# Patient Record
Sex: MALE | Race: OTHER | HISPANIC OR LATINO
[De-identification: names, ages, dates, MRNs, and addresses within clinical notes are randomized per-mention and may not be internally consistent; named-entity substitution may affect disease eponyms.]

---

## 2018-12-04 ENCOUNTER — APPOINTMENT (OUTPATIENT)
Dept: NEUROLOGY | Facility: CLINIC | Age: 58
End: 2018-12-04
Payer: COMMERCIAL

## 2018-12-04 VITALS
DIASTOLIC BLOOD PRESSURE: 81 MMHG | HEART RATE: 87 BPM | WEIGHT: 130 LBS | SYSTOLIC BLOOD PRESSURE: 146 MMHG | HEIGHT: 62 IN | BODY MASS INDEX: 23.92 KG/M2

## 2018-12-04 DIAGNOSIS — Z87.19 PERSONAL HISTORY OF OTHER DISEASES OF THE DIGESTIVE SYSTEM: ICD-10-CM

## 2018-12-04 DIAGNOSIS — Z86.39 PERSONAL HISTORY OF OTHER ENDOCRINE, NUTRITIONAL AND METABOLIC DISEASE: ICD-10-CM

## 2018-12-04 DIAGNOSIS — Z87.442 PERSONAL HISTORY OF URINARY CALCULI: ICD-10-CM

## 2018-12-04 DIAGNOSIS — Z87.39 PERSONAL HISTORY OF OTHER DISEASES OF THE MUSCULOSKELETAL SYSTEM AND CONNECTIVE TISSUE: ICD-10-CM

## 2018-12-04 PROCEDURE — 99205 OFFICE O/P NEW HI 60 MIN: CPT

## 2018-12-05 ENCOUNTER — OTHER (OUTPATIENT)
Age: 58
End: 2018-12-05

## 2018-12-15 ENCOUNTER — OUTPATIENT (OUTPATIENT)
Dept: OUTPATIENT SERVICES | Facility: HOSPITAL | Age: 58
LOS: 1 days | End: 2018-12-15

## 2018-12-15 DIAGNOSIS — G95.9 DISEASE OF SPINAL CORD, UNSPECIFIED: ICD-10-CM

## 2018-12-23 ENCOUNTER — FORM ENCOUNTER (OUTPATIENT)
Age: 58
End: 2018-12-23

## 2018-12-24 ENCOUNTER — APPOINTMENT (OUTPATIENT)
Dept: MRI IMAGING | Facility: CLINIC | Age: 58
End: 2018-12-24
Payer: COMMERCIAL

## 2018-12-24 ENCOUNTER — OUTPATIENT (OUTPATIENT)
Dept: OUTPATIENT SERVICES | Facility: HOSPITAL | Age: 58
LOS: 1 days | End: 2018-12-24
Payer: COMMERCIAL

## 2018-12-24 DIAGNOSIS — Z00.8 ENCOUNTER FOR OTHER GENERAL EXAMINATION: ICD-10-CM

## 2018-12-24 PROCEDURE — 70551 MRI BRAIN STEM W/O DYE: CPT

## 2018-12-24 PROCEDURE — 72141 MRI NECK SPINE W/O DYE: CPT

## 2018-12-24 PROCEDURE — 70551 MRI BRAIN STEM W/O DYE: CPT | Mod: 26

## 2018-12-24 PROCEDURE — 72141 MRI NECK SPINE W/O DYE: CPT | Mod: 26

## 2019-01-08 ENCOUNTER — APPOINTMENT (OUTPATIENT)
Dept: SURGICAL ONCOLOGY | Facility: CLINIC | Age: 59
End: 2019-01-08
Payer: COMMERCIAL

## 2019-01-08 VITALS
WEIGHT: 138 LBS | HEIGHT: 62 IN | SYSTOLIC BLOOD PRESSURE: 126 MMHG | RESPIRATION RATE: 15 BRPM | HEART RATE: 93 BPM | BODY MASS INDEX: 25.4 KG/M2 | DIASTOLIC BLOOD PRESSURE: 78 MMHG | OXYGEN SATURATION: 97 %

## 2019-01-08 PROCEDURE — 99244 OFF/OP CNSLTJ NEW/EST MOD 40: CPT

## 2019-01-08 NOTE — PHYSICAL EXAM
[Normal] : supple, no neck mass and thyroid not enlarged [Normal] : oriented to person, place and time, with appropriate affect

## 2019-01-16 NOTE — REVIEW OF SYSTEMS
[Patient Intake Form Reviewed] : Patient intake form was reviewed [Negative] : Heme/Lymph [FreeTextEntry8] : abdominal pain, dark stools, constipation

## 2019-01-16 NOTE — ASSESSMENT
[FreeTextEntry1] : 58 year old male presents for an initial consultation for newly diagnosed gastric cancer, referred by Dr. Nguyen. \par The patient was found to have iron deficiency anemia and was experiencing right abdominal pain and was referred for an endoscopy on 12/21/18. On endoscopy he was found to have a large antral mass, about 4 cm, with central umbilication, found in the antrum, next to the incisura.  Pathology of the stomach antrum mass showed invasive moderately differentiated adenocarcinoma, negative for H. Pylori.  Subsequently, he underwent a CT of the abdomen and pelvis on 12/28/18 which showed a mass in the gastric body near the antrum measuring 3.8 x 1.47 cm in size, mildly enlarged gastrohepatic ligament lymph nodes, enlarged spleen measuring 15.2 cm, numerous bilateral nonobstructing renal stones, bilateral renal stones, cholelithiasis. \par \par \par PLAN:\par 1) EUS for full staging\par 2) Referral to medical oncology for likely perioperative chemotherapy\par 3) RTO in 2-3 weeks

## 2019-01-16 NOTE — HISTORY OF PRESENT ILLNESS
[de-identified] : 58 year old male presents for an initial consultation for newly diagnosed gastric cancer, referred by Dr. Nguyen. \par \par The patient was found to have iron deficiency anemia and was experiencing right abdominal pain and was referred for an endoscopy on 12/21/18. On endoscopy he was found to have a large antral mass, about 4 cm, with central umbilication, found in the antrum, next to the incisura.  Pathology of the stomach antrum mass showed invasive moderately differentiated adenocarcinoma, negative for H. Pylori. \par \par Subsequently, he underwent a CT of the abdomen and pelvis on 12/28/18 which showed a mass in the gastric body near the antrum measuring 3.8 x 1.47 cm in size, mildly enlarged gastrohepatic ligament lymph nodes, enlarged spleen measuring 15.2 cm, numerous bilateral nonobstructing renal stones, bilateral renal stones, cholelithiasis. \par \par PMH notable for kidney stones s/p kidney stone extraction, right inguinal hernia repair, arthritis. Never a smoker, denies alcohol use.  Denies family history of malignancies. \par \par Today he is with c/o occasional right upper quadrant abdominal pain which is worse after eating x 1 week.  He denies nausea, vomiting or bowel habit changes. He states he was started on iron supplements and since then his stools have been very dark and he has been constipated. Denies BRBPR, passing flatus.

## 2019-01-16 NOTE — CONSULT LETTER
[Dear  ___] : Dear  [unfilled], [Consult Letter:] : I had the pleasure of evaluating your patient, [unfilled]. [Please see my note below.] : Please see my note below. [Consult Closing:] : Thank you very much for allowing me to participate in the care of this patient.  If you have any questions, please do not hesitate to contact me. [Sincerely,] : Sincerely, [DrArleen  ___] : Dr. GAMEZ [FreeTextEntry3] : Trey Peter MD, MPH, FACS\par Surgical Oncology\par Assistant Professor of Surgery\par [facility] [employer] at [facility]

## 2019-01-16 NOTE — REASON FOR VISIT
[Initial Consultation] : an initial consultation for [Gastric Cancer] : gastric cancer [[employer] ] : provided by [employer]   [FreeTextEntry1] : [id number] [FreeTextEntry2] : Fausto

## 2019-01-18 ENCOUNTER — OTHER (OUTPATIENT)
Age: 59
End: 2019-01-18

## 2019-01-29 ENCOUNTER — OUTPATIENT (OUTPATIENT)
Dept: OUTPATIENT SERVICES | Facility: HOSPITAL | Age: 59
LOS: 1 days | End: 2019-01-29
Payer: COMMERCIAL

## 2019-01-29 ENCOUNTER — APPOINTMENT (OUTPATIENT)
Dept: GASTROENTEROLOGY | Facility: HOSPITAL | Age: 59
End: 2019-01-29

## 2019-01-29 DIAGNOSIS — C16.9 MALIGNANT NEOPLASM OF STOMACH, UNSPECIFIED: ICD-10-CM

## 2019-01-29 PROCEDURE — 43259 EGD US EXAM DUODENUM/JEJUNUM: CPT | Mod: GC

## 2019-01-29 PROCEDURE — 43237 ENDOSCOPIC US EXAM ESOPH: CPT | Mod: 58

## 2019-02-05 ENCOUNTER — APPOINTMENT (OUTPATIENT)
Dept: SURGICAL ONCOLOGY | Facility: CLINIC | Age: 59
End: 2019-02-05
Payer: COMMERCIAL

## 2019-02-05 VITALS
HEIGHT: 62 IN | RESPIRATION RATE: 15 BRPM | BODY MASS INDEX: 25.4 KG/M2 | HEART RATE: 95 BPM | SYSTOLIC BLOOD PRESSURE: 109 MMHG | WEIGHT: 138 LBS | DIASTOLIC BLOOD PRESSURE: 69 MMHG

## 2019-02-05 PROCEDURE — 99214 OFFICE O/P EST MOD 30 MIN: CPT

## 2019-02-05 NOTE — ASSESSMENT
[FreeTextEntry1] : 58 year old male presents for a follow up visit. Initially consulted 1/8/19 for newly diagnosed gastric cancer, referred by Dr. Nguyen.  The patient was found to have iron deficiency anemia and was experiencing right abdominal pain and was referred for an endoscopy on 12/21/18. On endoscopy he was found to have a large antral mass, about 4 cm, with central umbilication, found in the antrum, next to the incisura.  Pathology of the stomach antrum mass showed invasive moderately differentiated adenocarcinoma, negative for H. Pylori.  Subsequently, he underwent a CT of the abdomen and pelvis on 12/28/18 which showed a mass in the gastric body near the antrum measuring 3.8 x 1.47 cm in size, mildly enlarged gastrohepatic ligament lymph nodes, enlarged spleen measuring 15.2 cm, numerous bilateral nonobstructing renal stones, bilateral renal stones, cholelithiasis. \par \par S/p EUS with Dr. Macdonald - Found to have a 36 mm x 26 mm hypoechoic heterogenous lesion in the gastric antrum extending into the serosa. A 1 cm lymph node was seen along the gastrohepatic ligament. This was staged T3N1 by EUS Criteria. \par \par He is scheduled for a Mediport placement tomorrow. Will begin chemotherapy with Dr. Barrera. \par \par \par PLAN:\par 1) Chemotherapy as per Dr. Barrera\par 2) RTO in 3 months after preoperative chemotherapy

## 2019-02-05 NOTE — HISTORY OF PRESENT ILLNESS
[de-identified] : 58 year old male presents for a follow up visit.\par Initially consulted 1/8/19 for newly diagnosed gastric cancer, referred by Dr. Nguyen. \par \par The patient was found to have iron deficiency anemia and was experiencing right abdominal pain and was referred for an endoscopy on 12/21/18. On endoscopy he was found to have a large antral mass, about 4 cm, with central umbilication, found in the antrum, next to the incisura.  Pathology of the stomach antrum mass showed invasive moderately differentiated adenocarcinoma, negative for H. Pylori. \par \par Subsequently, he underwent a CT of the abdomen and pelvis on 12/28/18 which showed a mass in the gastric body near the antrum measuring 3.8 x 1.47 cm in size, mildly enlarged gastrohepatic ligament lymph nodes, enlarged spleen measuring 15.2 cm, numerous bilateral nonobstructing renal stones, bilateral renal stones, cholelithiasis. \par \par PMH notable for kidney stones s/p kidney stone extraction, right inguinal hernia repair, arthritis. Never a smoker, denies alcohol use.  Denies family history of malignancies. \par \par Today he is with c/o occasional right upper quadrant abdominal pain which is worse after eating x 1 week.  He denies nausea, vomiting or bowel habit changes. He states he was started on iron supplements and since then his stools have been very dark and he has been constipated. Denies BRBPR, passing flatus. \par \par INTERVAL HISTORY:\par 1/29/19 -S/p EUS with Dr. Macdonald - Found to have a 36 mm x 26 mm hypoechoic heterogenous lesion in the gastric antrum extending into the serosa. A 1 cm lymph node was seen along the gastrohepatic ligament. This was staged T3N1 by EUS Criteria. \par \par 2/5/19 - The patient is scheduled for a Mediport placement tomorrow. He will begin chemotherapy 5 days after Mediport placement under the care of Dr. Barrera.  Today he is with c/o mild, occasional mid abdominal discomfort. No other changes.

## 2019-02-05 NOTE — REASON FOR VISIT
[Follow-Up Visit] : a follow-up visit for [Gastric Cancer] : gastric cancer [[employer] ] : provided by [employer]   [FreeTextEntry1] : [id number] [FreeTextEntry2] : Henrry

## 2019-02-05 NOTE — CONSULT LETTER
[Dear  ___] : Dear  [unfilled], [Consult Letter:] : I had the pleasure of evaluating your patient, [unfilled]. [Please see my note below.] : Please see my note below. [Consult Closing:] : Thank you very much for allowing me to participate in the care of this patient.  If you have any questions, please do not hesitate to contact me. [Sincerely,] : Sincerely, [DrArleen  ___] : Dr. GAMEZ [FreeTextEntry3] : Trey Peter MD, MPH, FACS\par Surgical Oncology\par Assistant Professor of Surgery\par [facility] [employer] at [facility]

## 2019-02-06 ENCOUNTER — OUTPATIENT (OUTPATIENT)
Dept: OUTPATIENT SERVICES | Facility: HOSPITAL | Age: 59
LOS: 1 days | End: 2019-02-06
Payer: COMMERCIAL

## 2019-02-06 DIAGNOSIS — C16.0 MALIGNANT NEOPLASM OF CARDIA: ICD-10-CM

## 2019-02-06 LAB
ANION GAP SERPL CALC-SCNC: 11 MMOL/L — SIGNIFICANT CHANGE UP (ref 5–17)
BUN SERPL-MCNC: 12 MG/DL — SIGNIFICANT CHANGE UP (ref 7–23)
CALCIUM SERPL-MCNC: 9.2 MG/DL — SIGNIFICANT CHANGE UP (ref 8.4–10.5)
CHLORIDE SERPL-SCNC: 105 MMOL/L — SIGNIFICANT CHANGE UP (ref 96–108)
CO2 SERPL-SCNC: 23 MMOL/L — SIGNIFICANT CHANGE UP (ref 22–31)
CREAT SERPL-MCNC: 0.88 MG/DL — SIGNIFICANT CHANGE UP (ref 0.5–1.3)
GLUCOSE SERPL-MCNC: 129 MG/DL — HIGH (ref 70–99)
INR BLD: 1.21 RATIO — HIGH (ref 0.88–1.16)
POTASSIUM SERPL-MCNC: 4.1 MMOL/L — SIGNIFICANT CHANGE UP (ref 3.5–5.3)
POTASSIUM SERPL-SCNC: 4.1 MMOL/L — SIGNIFICANT CHANGE UP (ref 3.5–5.3)
PROTHROM AB SERPL-ACNC: 13.9 SEC — HIGH (ref 10–12.9)
SODIUM SERPL-SCNC: 139 MMOL/L — SIGNIFICANT CHANGE UP (ref 135–145)

## 2019-02-06 PROCEDURE — 36561 INSERT TUNNELED CV CATH: CPT

## 2019-02-06 PROCEDURE — C1894: CPT

## 2019-02-06 PROCEDURE — 77001 FLUOROGUIDE FOR VEIN DEVICE: CPT | Mod: 26

## 2019-02-06 PROCEDURE — 76937 US GUIDE VASCULAR ACCESS: CPT | Mod: 26

## 2019-02-06 PROCEDURE — 80048 BASIC METABOLIC PNL TOTAL CA: CPT

## 2019-02-06 PROCEDURE — 85610 PROTHROMBIN TIME: CPT

## 2019-02-06 PROCEDURE — C1769: CPT

## 2019-02-06 PROCEDURE — 77001 FLUOROGUIDE FOR VEIN DEVICE: CPT

## 2019-02-06 PROCEDURE — 76937 US GUIDE VASCULAR ACCESS: CPT

## 2019-02-06 PROCEDURE — C1788: CPT

## 2019-02-07 ENCOUNTER — APPOINTMENT (OUTPATIENT)
Dept: NEUROLOGY | Facility: CLINIC | Age: 59
End: 2019-02-07
Payer: COMMERCIAL

## 2019-02-07 DIAGNOSIS — G95.9 DISEASE OF SPINAL CORD, UNSPECIFIED: ICD-10-CM

## 2019-02-07 PROCEDURE — 99214 OFFICE O/P EST MOD 30 MIN: CPT | Mod: 25

## 2019-02-07 PROCEDURE — 95885 MUSC TST DONE W/NERV TST LIM: CPT

## 2019-02-07 PROCEDURE — 95909 NRV CNDJ TST 5-6 STUDIES: CPT

## 2019-02-07 NOTE — HISTORY OF PRESENT ILLNESS
[FreeTextEntry1] : Patient notes no change in weakness\par \par MRI C spine and brain not revealing, labs not done

## 2019-02-07 NOTE — ASSESSMENT
[FreeTextEntry1] : EMG/NCV today is consistent with early signs of MND.  \par \par Will check CPK, vit B12 and MMA, f/u 4 months, if exam progresses, will start riluzole at that time.  He does not currently fulfil criteria for definite ALS, but he does for probable

## 2019-02-07 NOTE — PHYSICAL EXAM
[Person] : oriented to person [Place] : oriented to place [Time] : oriented to time [Short Term Intact] : short term memory intact [Remote Intact] : remote memory intact [Registration Intact] : recent registration memory intact [Concentration Intact] : normal concentrating ability [Visual Intact] : visual attention was ~T not ~L decreased [Naming Objects] : no difficulty naming common objects [Repeating Phrases] : no difficulty repeating a phrase [Writing A Sentence] : no difficulty writing a sentence [Fluency] : fluency intact [Comprehension] : comprehension intact [Reading] : reading intact [Past History] : adequate knowledge of personal past history [Cranial Nerves Optic (II)] : visual acuity intact bilaterally,  visual fields full to confrontation, pupils equal round and reactive to light [Cranial Nerves Oculomotor (III)] : extraocular motion intact [Cranial Nerves Trigeminal (V)] : facial sensation intact symmetrically [Cranial Nerves Facial (VII)] : face symmetrical [Cranial Nerves Vestibulocochlear (VIII)] : hearing was intact bilaterally [Cranial Nerves Glossopharyngeal (IX)] : tongue and palate midline [Cranial Nerves Accessory (XI - Cranial And Spinal)] : head turning and shoulder shrug symmetric [Cranial Nerves Hypoglossal (XII)] : there was no tongue deviation with protrusion [Motor Tone] : muscle tone was normal in all four extremities [No Muscle Atrophy] : normal bulk in all four extremities [+4] : hip flexion +4/5 [5] : ankle plantar flexion 5/5 [Sensation Tactile Decrease] : light touch was intact [Sensation Vibration Decrease] : vibration was intact [Proprioception] : proprioception was intact [Abnormal Walk] : normal gait [Balance] : balance was intact [Past-pointing] : there was no past-pointing [Tremor] : no tremor present [4+] : Ankle jerk left 4+ [Plantar Reflex Right Only] : abnormal on the right [Plantar Reflex Left Only] : abnormal on the left [FreeTextEntry6] : Limited shoulder ROM. Bilateral pectoral and deltoid jerks [FreeTextEntry9] : crossed adduction and suprapatellars

## 2019-02-08 DIAGNOSIS — C16.9 MALIGNANT NEOPLASM OF STOMACH, UNSPECIFIED: ICD-10-CM

## 2019-02-08 DIAGNOSIS — Z45.2 ENCOUNTER FOR ADJUSTMENT AND MANAGEMENT OF VASCULAR ACCESS DEVICE: ICD-10-CM

## 2019-02-08 LAB
CK SERPL-CCNC: 46 U/L
VIT B12 SERPL-MCNC: 1278 PG/ML

## 2019-02-11 LAB — METHYLMALONATE SERPL-SCNC: 142 NMOL/L

## 2019-04-09 ENCOUNTER — APPOINTMENT (OUTPATIENT)
Dept: SURGICAL ONCOLOGY | Facility: CLINIC | Age: 59
End: 2019-04-09
Payer: COMMERCIAL

## 2019-04-09 VITALS
RESPIRATION RATE: 18 BRPM | SYSTOLIC BLOOD PRESSURE: 111 MMHG | WEIGHT: 132 LBS | HEART RATE: 90 BPM | HEIGHT: 62 IN | DIASTOLIC BLOOD PRESSURE: 74 MMHG | OXYGEN SATURATION: 100 % | BODY MASS INDEX: 24.29 KG/M2

## 2019-04-09 PROCEDURE — 99214 OFFICE O/P EST MOD 30 MIN: CPT

## 2019-04-09 NOTE — CONSULT LETTER
[Dear  ___] : Dear  [unfilled], [Consult Letter:] : I had the pleasure of evaluating your patient, [unfilled]. [Consult Closing:] : Thank you very much for allowing me to participate in the care of this patient.  If you have any questions, please do not hesitate to contact me. [Please see my note below.] : Please see my note below. [Sincerely,] : Sincerely, [DrArleen  ___] : Dr. GAMEZ [FreeTextEntry3] : Trey Peter MD, MPH, FACS\par Surgical Oncology\par Assistant Professor of Surgery\par [facility] [employer] at [facility]

## 2019-04-09 NOTE — PHYSICAL EXAM
[Normal] : supple, no neck mass and thyroid not enlarged [Normal] : oriented to person, place and time, with appropriate affect [de-identified] : soft NT ND

## 2019-04-09 NOTE — ASSESSMENT
[FreeTextEntry1] : 58 year old male presents for a follow up visit. Initially consulted 1/8/19 for newly diagnosed gastric cancer, referred by Dr. Nguyen.  The patient was found to have iron deficiency anemia and was experiencing right abdominal pain and was referred for an endoscopy on 12/21/18. On endoscopy he was found to have a large antral mass, about 4 cm, with central umbilication, found in the antrum, next to the incisura.  Pathology of the stomach antrum mass showed invasive moderately differentiated adenocarcinoma, negative for H. Pylori.  Subsequently, he underwent a CT of the abdomen and pelvis on 12/28/18 which showed a mass in the gastric body near the antrum measuring 3.8 x 1.47 cm in size, mildly enlarged gastrohepatic ligament lymph nodes, enlarged spleen measuring 15.2 cm, numerous bilateral nonobstructing renal stones, bilateral renal stones, cholelithiasis. \par \par S/p EUS with Dr. Macdonald - Found to have a 36 mm x 26 mm hypoechoic heterogenous lesion in the gastric antrum extending into the serosa. A 1 cm lymph node was seen along the gastrohepatic ligament. This was staged T3N1 by EUS Criteria. \par \par He is scheduled for a Mediport placement tomorrow. Will begin chemotherapy with Dr. Barrera. \par s/p 4 cycles of FLOT with Dr. Barrera\par \par PLAN:\par 1) OR for robotic-assisted laparoscopic distal gastrectomy, regional lymphadenectomy, EGD, possible open.  We discussed the risks, benefits, and alternatives of the procedure with the patient, he expressed understanding and agree to proceed.\par 2) CT chest/abdomen/pelvis for interval post chemo assessment prior to surgery\par 3) Medical clearance

## 2019-04-09 NOTE — HISTORY OF PRESENT ILLNESS
[de-identified] : 58 year old male presents for a follow up visit.\par Initially consulted 1/8/19 for newly diagnosed gastric cancer, referred by Dr. Nguyen. \par \par The patient was found to have iron deficiency anemia and was experiencing right abdominal pain and was referred for an endoscopy on 12/21/18. On endoscopy he was found to have a large antral mass, about 4 cm, with central umbilication, found in the antrum, next to the incisura.  Pathology of the stomach antrum mass showed invasive moderately differentiated adenocarcinoma, negative for H. Pylori. \par \par Subsequently, he underwent a CT of the abdomen and pelvis on 12/28/18 which showed a mass in the gastric body near the antrum measuring 3.8 x 1.47 cm in size, mildly enlarged gastrohepatic ligament lymph nodes, enlarged spleen measuring 15.2 cm, numerous bilateral nonobstructing renal stones, bilateral renal stones, cholelithiasis. \par \par PMH notable for kidney stones s/p kidney stone extraction, right inguinal hernia repair, arthritis. Never a smoker, denies alcohol use.  Denies family history of malignancies. \par \par Today he is with c/o occasional right upper quadrant abdominal pain which is worse after eating x 1 week.  He denies nausea, vomiting or bowel habit changes. He states he was started on iron supplements and since then his stools have been very dark and he has been constipated. Denies BRBPR, passing flatus. \par \par INTERVAL HISTORY:\par 1/29/19 -S/p EUS with Dr. Macdonald - Found to have a 36 mm x 26 mm hypoechoic heterogenous lesion in the gastric antrum extending into the serosa. A 1 cm lymph node was seen along the gastrohepatic ligament. This was staged T3N1 by EUS Criteria. \par \par 2/5/19 - The patient is scheduled for a Mediport placement tomorrow. He will begin chemotherapy 5 days after Mediport placement under the care of Dr. Barrera.  Today he is with c/o mild, occasional mid abdominal discomfort. No other changes. \par \par 4/9/19 - Finished 4 cycles of FLOT with Dr. Barrera.

## 2019-04-09 NOTE — REASON FOR VISIT
[Follow-Up Visit] : a follow-up visit for [Gastric Cancer] : gastric cancer [[employer] ] : provided by [employer]   [FreeTextEntry1] : [id number] [FreeTextEntry2] : Henrry

## 2019-04-11 ENCOUNTER — FORM ENCOUNTER (OUTPATIENT)
Age: 59
End: 2019-04-11

## 2019-04-12 ENCOUNTER — APPOINTMENT (OUTPATIENT)
Dept: CT IMAGING | Facility: CLINIC | Age: 59
End: 2019-04-12
Payer: COMMERCIAL

## 2019-04-12 ENCOUNTER — OUTPATIENT (OUTPATIENT)
Dept: OUTPATIENT SERVICES | Facility: HOSPITAL | Age: 59
LOS: 1 days | End: 2019-04-12
Payer: COMMERCIAL

## 2019-04-12 DIAGNOSIS — C16.9 MALIGNANT NEOPLASM OF STOMACH, UNSPECIFIED: ICD-10-CM

## 2019-04-12 PROCEDURE — 74177 CT ABD & PELVIS W/CONTRAST: CPT

## 2019-04-12 PROCEDURE — 71260 CT THORAX DX C+: CPT

## 2019-04-12 PROCEDURE — 71260 CT THORAX DX C+: CPT | Mod: 26

## 2019-04-12 PROCEDURE — 74177 CT ABD & PELVIS W/CONTRAST: CPT | Mod: 26

## 2019-04-16 ENCOUNTER — OUTPATIENT (OUTPATIENT)
Dept: OUTPATIENT SERVICES | Facility: HOSPITAL | Age: 59
LOS: 1 days | End: 2019-04-16
Payer: COMMERCIAL

## 2019-04-16 VITALS
RESPIRATION RATE: 14 BRPM | DIASTOLIC BLOOD PRESSURE: 86 MMHG | OXYGEN SATURATION: 98 % | HEIGHT: 63 IN | TEMPERATURE: 98 F | SYSTOLIC BLOOD PRESSURE: 120 MMHG | WEIGHT: 132.06 LBS | HEART RATE: 95 BPM

## 2019-04-16 DIAGNOSIS — C16.9 MALIGNANT NEOPLASM OF STOMACH, UNSPECIFIED: ICD-10-CM

## 2019-04-16 DIAGNOSIS — N20.0 CALCULUS OF KIDNEY: Chronic | ICD-10-CM

## 2019-04-16 DIAGNOSIS — Z98.890 OTHER SPECIFIED POSTPROCEDURAL STATES: Chronic | ICD-10-CM

## 2019-04-16 DIAGNOSIS — C16.9 MALIGNANT NEOPLASM OF STOMACH, UNSPECIFIED: Chronic | ICD-10-CM

## 2019-04-16 LAB
ALBUMIN SERPL ELPH-MCNC: 4.2 G/DL — SIGNIFICANT CHANGE UP (ref 3.3–5)
ALP SERPL-CCNC: 96 U/L — SIGNIFICANT CHANGE UP (ref 40–120)
ALT FLD-CCNC: 16 U/L — SIGNIFICANT CHANGE UP (ref 4–41)
ANION GAP SERPL CALC-SCNC: 14 MMO/L — SIGNIFICANT CHANGE UP (ref 7–14)
APTT BLD: 39.9 SEC — HIGH (ref 27.5–36.3)
AST SERPL-CCNC: 25 U/L — SIGNIFICANT CHANGE UP (ref 4–40)
BILIRUB SERPL-MCNC: 0.5 MG/DL — SIGNIFICANT CHANGE UP (ref 0.2–1.2)
BLD GP AB SCN SERPL QL: NEGATIVE — SIGNIFICANT CHANGE UP
BUN SERPL-MCNC: 8 MG/DL — SIGNIFICANT CHANGE UP (ref 7–23)
CALCIUM SERPL-MCNC: 9.7 MG/DL — SIGNIFICANT CHANGE UP (ref 8.4–10.5)
CHLORIDE SERPL-SCNC: 103 MMOL/L — SIGNIFICANT CHANGE UP (ref 98–107)
CO2 SERPL-SCNC: 24 MMOL/L — SIGNIFICANT CHANGE UP (ref 22–31)
CREAT SERPL-MCNC: 1 MG/DL — SIGNIFICANT CHANGE UP (ref 0.5–1.3)
GLUCOSE SERPL-MCNC: 78 MG/DL — SIGNIFICANT CHANGE UP (ref 70–99)
HBA1C BLD-MCNC: 5.1 % — SIGNIFICANT CHANGE UP (ref 4–5.6)
HCT VFR BLD CALC: 36.9 % — LOW (ref 39–50)
HGB BLD-MCNC: 11 G/DL — LOW (ref 13–17)
INR BLD: 1.1 — SIGNIFICANT CHANGE UP (ref 0.88–1.17)
MCHC RBC-ENTMCNC: 24.2 PG — LOW (ref 27–34)
MCHC RBC-ENTMCNC: 29.8 % — LOW (ref 32–36)
MCV RBC AUTO: 81.1 FL — SIGNIFICANT CHANGE UP (ref 80–100)
NRBC # FLD: 0 K/UL — SIGNIFICANT CHANGE UP (ref 0–0)
PLATELET # BLD AUTO: 190 K/UL — SIGNIFICANT CHANGE UP (ref 150–400)
PMV BLD: 9.4 FL — SIGNIFICANT CHANGE UP (ref 7–13)
POTASSIUM SERPL-MCNC: 3.7 MMOL/L — SIGNIFICANT CHANGE UP (ref 3.5–5.3)
POTASSIUM SERPL-SCNC: 3.7 MMOL/L — SIGNIFICANT CHANGE UP (ref 3.5–5.3)
PROT SERPL-MCNC: 7.1 G/DL — SIGNIFICANT CHANGE UP (ref 6–8.3)
PROTHROM AB SERPL-ACNC: 12.2 SEC — SIGNIFICANT CHANGE UP (ref 9.8–13.1)
RBC # BLD: 4.55 M/UL — SIGNIFICANT CHANGE UP (ref 4.2–5.8)
RBC # FLD: 18.4 % — HIGH (ref 10.3–14.5)
RH IG SCN BLD-IMP: POSITIVE — SIGNIFICANT CHANGE UP
SODIUM SERPL-SCNC: 141 MMOL/L — SIGNIFICANT CHANGE UP (ref 135–145)
WBC # BLD: 5.64 K/UL — SIGNIFICANT CHANGE UP (ref 3.8–10.5)
WBC # FLD AUTO: 5.64 K/UL — SIGNIFICANT CHANGE UP (ref 3.8–10.5)

## 2019-04-16 PROCEDURE — 93010 ELECTROCARDIOGRAM REPORT: CPT

## 2019-04-16 RX ORDER — SODIUM CHLORIDE 9 MG/ML
1000 INJECTION, SOLUTION INTRAVENOUS
Qty: 0 | Refills: 0 | Status: DISCONTINUED | OUTPATIENT
Start: 2019-04-24 | End: 2019-04-24

## 2019-04-16 NOTE — H&P PST ADULT - RS GEN PE MLT RESP DETAILS PC
no rhonchi/good air movement/breath sounds equal/no wheezes/no rales/clear to auscultation bilaterally/no chest wall tenderness/no intercostal retractions/respirations non-labored

## 2019-04-16 NOTE — H&P PST ADULT - NEGATIVE CARDIOVASCULAR SYMPTOMS
no dyspnea on exertion/no paroxysmal nocturnal dyspnea/no orthopnea/no peripheral edema/no palpitations/no claudication/no chest pain

## 2019-04-16 NOTE — H&P PST ADULT - GASTROINTESTINAL DETAILS
no rigidity/no bruit/no rebound tenderness/nontender/soft/no distention/no organomegaly/no masses palpable/bowel sounds normal/no guarding

## 2019-04-16 NOTE — H&P PST ADULT - NEGATIVE GENERAL SYMPTOMS
no chills/no sweating/no weight gain/no polyphagia/no polyuria/no malaise/no fatigue/no fever/no polydipsia

## 2019-04-16 NOTE — H&P PST ADULT - HISTORY OF PRESENT ILLNESS
57y/o male scheduled for robotic distal gastrectomy, regional lymphadenectomy, upper endoscopy possible open on 4/24/2019.  Pt states, "had abdominal pain and anemia s/p endoscopy.  Identified gastric mass, bx positive for malignancy.   Underwent right chest mediport, s/p chemotherapy last dose 3 weeks ago.  Denies abdominal pain."

## 2019-04-16 NOTE — H&P PST ADULT - NSICDXPROBLEM_GEN_ALL_CORE_FT
PROBLEM DIAGNOSES  Problem: Malignant neoplasm of stomach  Assessment and Plan: Pt scheduled for robotic distal gastrectomy, regional lymph adenectomy, upper endoscopy possible open on 4/24/2019.  labs done results pending, ekg done.  Hibiclens provided.  Pt scheduled for medical eval as per surgeons office.  Preop teaching done, pt able to verbalize understanding.

## 2019-04-16 NOTE — H&P PST ADULT - NSICDXPASTSURGICALHX_GEN_ALL_CORE_FT
PAST SURGICAL HISTORY:  Kidney stone on right side removal    Malignant neoplasm of stomach right chest mediport 1/2019    S/P inguinal hernia repair right

## 2019-04-16 NOTE — H&P PST ADULT - NEGATIVE BREAST SYMPTOMS
no breast lump L/no breast tenderness R/no nipple discharge L/no breast lump R/no nipple discharge R/no breast tenderness L

## 2019-04-16 NOTE — H&P PST ADULT - ATTENDING COMMENTS
Risks, benefits, and alternatives discussed with the patient via  - he expressed understanding and agrees to proceed with robotic-assisted laparoscopic partial gastrectomy, regional lymphadenectomy, esophagogastroduodenoscopy, possible open.

## 2019-04-17 ENCOUNTER — OUTPATIENT (OUTPATIENT)
Dept: OUTPATIENT SERVICES | Facility: HOSPITAL | Age: 59
LOS: 1 days | End: 2019-04-17
Payer: COMMERCIAL

## 2019-04-17 ENCOUNTER — RESULT REVIEW (OUTPATIENT)
Age: 59
End: 2019-04-17

## 2019-04-17 DIAGNOSIS — C16.9 MALIGNANT NEOPLASM OF STOMACH, UNSPECIFIED: Chronic | ICD-10-CM

## 2019-04-17 DIAGNOSIS — Z98.890 OTHER SPECIFIED POSTPROCEDURAL STATES: Chronic | ICD-10-CM

## 2019-04-17 DIAGNOSIS — K31.9 DISEASE OF STOMACH AND DUODENUM, UNSPECIFIED: ICD-10-CM

## 2019-04-17 DIAGNOSIS — N20.0 CALCULUS OF KIDNEY: Chronic | ICD-10-CM

## 2019-04-17 DIAGNOSIS — C16.9 MALIGNANT NEOPLASM OF STOMACH, UNSPECIFIED: ICD-10-CM

## 2019-04-17 PROCEDURE — 88321 CONSLTJ&REPRT SLD PREP ELSWR: CPT

## 2019-04-23 ENCOUNTER — TRANSCRIPTION ENCOUNTER (OUTPATIENT)
Age: 59
End: 2019-04-23

## 2019-04-23 PROBLEM — C16.9 MALIGNANT NEOPLASM OF STOMACH, UNSPECIFIED: Chronic | Status: ACTIVE | Noted: 2019-04-16

## 2019-04-23 NOTE — ASU PATIENT PROFILE, ADULT - PSH
Kidney stone on right side  removal  Malignant neoplasm of stomach  right chest mediport 1/2019  S/P inguinal hernia repair  right

## 2019-04-24 ENCOUNTER — INPATIENT (INPATIENT)
Facility: HOSPITAL | Age: 59
LOS: 4 days | Discharge: HOME | End: 2019-04-29
Attending: SURGERY | Admitting: SURGERY
Payer: COMMERCIAL

## 2019-04-24 ENCOUNTER — APPOINTMENT (OUTPATIENT)
Dept: SURGICAL ONCOLOGY | Facility: HOSPITAL | Age: 59
End: 2019-04-24

## 2019-04-24 ENCOUNTER — RESULT REVIEW (OUTPATIENT)
Age: 59
End: 2019-04-24

## 2019-04-24 VITALS
RESPIRATION RATE: 16 BRPM | SYSTOLIC BLOOD PRESSURE: 128 MMHG | HEIGHT: 63 IN | HEART RATE: 106 BPM | TEMPERATURE: 99 F | DIASTOLIC BLOOD PRESSURE: 78 MMHG | OXYGEN SATURATION: 97 % | WEIGHT: 132.06 LBS

## 2019-04-24 DIAGNOSIS — C16.9 MALIGNANT NEOPLASM OF STOMACH, UNSPECIFIED: Chronic | ICD-10-CM

## 2019-04-24 DIAGNOSIS — Z98.890 OTHER SPECIFIED POSTPROCEDURAL STATES: Chronic | ICD-10-CM

## 2019-04-24 DIAGNOSIS — N20.0 CALCULUS OF KIDNEY: Chronic | ICD-10-CM

## 2019-04-24 DIAGNOSIS — C16.9 MALIGNANT NEOPLASM OF STOMACH, UNSPECIFIED: ICD-10-CM

## 2019-04-24 LAB
ANION GAP SERPL CALC-SCNC: 13 MMO/L — SIGNIFICANT CHANGE UP (ref 7–14)
BASE EXCESS BLDA CALC-SCNC: -0.1 MMOL/L — SIGNIFICANT CHANGE UP
BASE EXCESS BLDA CALC-SCNC: -0.2 MMOL/L — SIGNIFICANT CHANGE UP
BASE EXCESS BLDA CALC-SCNC: -0.6 MMOL/L — SIGNIFICANT CHANGE UP
BUN SERPL-MCNC: 6 MG/DL — LOW (ref 7–23)
CA-I BLDA-SCNC: 1.13 MMOL/L — LOW (ref 1.15–1.29)
CA-I BLDA-SCNC: 1.16 MMOL/L — SIGNIFICANT CHANGE UP (ref 1.15–1.29)
CA-I BLDA-SCNC: 1.2 MMOL/L — SIGNIFICANT CHANGE UP (ref 1.15–1.29)
CALCIUM SERPL-MCNC: 9.3 MG/DL — SIGNIFICANT CHANGE UP (ref 8.4–10.5)
CHLORIDE SERPL-SCNC: 102 MMOL/L — SIGNIFICANT CHANGE UP (ref 98–107)
CO2 SERPL-SCNC: 24 MMOL/L — SIGNIFICANT CHANGE UP (ref 22–31)
CREAT SERPL-MCNC: 0.77 MG/DL — SIGNIFICANT CHANGE UP (ref 0.5–1.3)
GLUCOSE BLDA-MCNC: 126 MG/DL — HIGH (ref 70–99)
GLUCOSE BLDA-MCNC: 146 MG/DL — HIGH (ref 70–99)
GLUCOSE BLDA-MCNC: 176 MG/DL — HIGH (ref 70–99)
GLUCOSE SERPL-MCNC: 198 MG/DL — HIGH (ref 70–99)
HCO3 BLDA-SCNC: 24 MMOL/L — SIGNIFICANT CHANGE UP (ref 22–26)
HCT VFR BLD CALC: 33 % — LOW (ref 39–50)
HCT VFR BLDA CALC: 25.4 % — LOW (ref 39–51)
HCT VFR BLDA CALC: 30 % — LOW (ref 39–51)
HCT VFR BLDA CALC: 30.3 % — LOW (ref 39–51)
HGB BLD-MCNC: 10.3 G/DL — LOW (ref 13–17)
HGB BLDA-MCNC: 8.2 G/DL — LOW (ref 13–17)
HGB BLDA-MCNC: 9.7 G/DL — LOW (ref 13–17)
HGB BLDA-MCNC: 9.8 G/DL — LOW (ref 13–17)
MCHC RBC-ENTMCNC: 25.3 PG — LOW (ref 27–34)
MCHC RBC-ENTMCNC: 31.2 % — LOW (ref 32–36)
MCV RBC AUTO: 81.1 FL — SIGNIFICANT CHANGE UP (ref 80–100)
NRBC # FLD: 0 K/UL — SIGNIFICANT CHANGE UP (ref 0–0)
PCO2 BLDA: 37 MMHG — SIGNIFICANT CHANGE UP (ref 35–48)
PCO2 BLDA: 39 MMHG — SIGNIFICANT CHANGE UP (ref 35–48)
PCO2 BLDA: 39 MMHG — SIGNIFICANT CHANGE UP (ref 35–48)
PH BLDA: 7.4 PH — SIGNIFICANT CHANGE UP (ref 7.35–7.45)
PH BLDA: 7.4 PH — SIGNIFICANT CHANGE UP (ref 7.35–7.45)
PH BLDA: 7.42 PH — SIGNIFICANT CHANGE UP (ref 7.35–7.45)
PLATELET # BLD AUTO: 193 K/UL — SIGNIFICANT CHANGE UP (ref 150–400)
PMV BLD: 9.6 FL — SIGNIFICANT CHANGE UP (ref 7–13)
PO2 BLDA: 112 MMHG — HIGH (ref 83–108)
PO2 BLDA: 233 MMHG — HIGH (ref 83–108)
PO2 BLDA: 485 MMHG — HIGH (ref 83–108)
POTASSIUM BLDA-SCNC: 3.3 MMOL/L — LOW (ref 3.4–4.5)
POTASSIUM BLDA-SCNC: 3.6 MMOL/L — SIGNIFICANT CHANGE UP (ref 3.4–4.5)
POTASSIUM BLDA-SCNC: 3.9 MMOL/L — SIGNIFICANT CHANGE UP (ref 3.4–4.5)
POTASSIUM SERPL-MCNC: 3.8 MMOL/L — SIGNIFICANT CHANGE UP (ref 3.5–5.3)
POTASSIUM SERPL-SCNC: 3.8 MMOL/L — SIGNIFICANT CHANGE UP (ref 3.5–5.3)
RBC # BLD: 4.07 M/UL — LOW (ref 4.2–5.8)
RBC # FLD: 20 % — HIGH (ref 10.3–14.5)
RH IG SCN BLD-IMP: POSITIVE — SIGNIFICANT CHANGE UP
SAO2 % BLDA: 100 % — HIGH (ref 95–99)
SAO2 % BLDA: 100 % — HIGH (ref 95–99)
SAO2 % BLDA: 99.7 % — HIGH (ref 95–99)
SODIUM BLDA-SCNC: 135 MMOL/L — LOW (ref 136–146)
SODIUM BLDA-SCNC: 135 MMOL/L — LOW (ref 136–146)
SODIUM BLDA-SCNC: 136 MMOL/L — SIGNIFICANT CHANGE UP (ref 136–146)
SODIUM SERPL-SCNC: 139 MMOL/L — SIGNIFICANT CHANGE UP (ref 135–145)
WBC # BLD: 7.13 K/UL — SIGNIFICANT CHANGE UP (ref 3.8–10.5)
WBC # FLD AUTO: 7.13 K/UL — SIGNIFICANT CHANGE UP (ref 3.8–10.5)

## 2019-04-24 PROCEDURE — 38747 REMOVE ABDOMINAL LYMPH NODES: CPT | Mod: 59

## 2019-04-24 PROCEDURE — 88332 PATH CONSLTJ SURG EA ADD BLK: CPT | Mod: 26

## 2019-04-24 PROCEDURE — 43632 REMOVAL OF STOMACH PARTIAL: CPT

## 2019-04-24 PROCEDURE — 88309 TISSUE EXAM BY PATHOLOGIST: CPT | Mod: 26

## 2019-04-24 PROCEDURE — 88360 TUMOR IMMUNOHISTOCHEM/MANUAL: CPT | Mod: 26

## 2019-04-24 PROCEDURE — 88331 PATH CONSLTJ SURG 1 BLK 1SPC: CPT | Mod: 26

## 2019-04-24 PROCEDURE — 88305 TISSUE EXAM BY PATHOLOGIST: CPT | Mod: 26

## 2019-04-24 PROCEDURE — S2900 ROBOTIC SURGICAL SYSTEM: CPT | Mod: NC

## 2019-04-24 RX ORDER — ONDANSETRON 8 MG/1
4 TABLET, FILM COATED ORAL ONCE
Qty: 0 | Refills: 0 | Status: COMPLETED | OUTPATIENT
Start: 2019-04-24 | End: 2019-04-25

## 2019-04-24 RX ORDER — SODIUM CHLORIDE 9 MG/ML
1000 INJECTION, SOLUTION INTRAVENOUS
Qty: 0 | Refills: 0 | Status: DISCONTINUED | OUTPATIENT
Start: 2019-04-24 | End: 2019-04-25

## 2019-04-24 RX ORDER — HYDROMORPHONE HYDROCHLORIDE 2 MG/ML
1 INJECTION INTRAMUSCULAR; INTRAVENOUS; SUBCUTANEOUS EVERY 4 HOURS
Qty: 0 | Refills: 0 | Status: DISCONTINUED | OUTPATIENT
Start: 2019-04-24 | End: 2019-04-24

## 2019-04-24 RX ORDER — HYDROMORPHONE HYDROCHLORIDE 2 MG/ML
0.5 INJECTION INTRAMUSCULAR; INTRAVENOUS; SUBCUTANEOUS
Qty: 0 | Refills: 0 | Status: DISCONTINUED | OUTPATIENT
Start: 2019-04-24 | End: 2019-04-25

## 2019-04-24 RX ORDER — ENOXAPARIN SODIUM 100 MG/ML
40 INJECTION SUBCUTANEOUS DAILY
Qty: 0 | Refills: 0 | Status: DISCONTINUED | OUTPATIENT
Start: 2019-04-24 | End: 2019-04-29

## 2019-04-24 RX ORDER — ACETAMINOPHEN 500 MG
1000 TABLET ORAL ONCE
Qty: 0 | Refills: 0 | Status: COMPLETED | OUTPATIENT
Start: 2019-04-25 | End: 2019-04-25

## 2019-04-24 RX ORDER — HYDROMORPHONE HYDROCHLORIDE 2 MG/ML
1 INJECTION INTRAMUSCULAR; INTRAVENOUS; SUBCUTANEOUS EVERY 4 HOURS
Qty: 0 | Refills: 0 | Status: DISCONTINUED | OUTPATIENT
Start: 2019-04-24 | End: 2019-04-26

## 2019-04-24 RX ORDER — PANTOPRAZOLE SODIUM 20 MG/1
40 TABLET, DELAYED RELEASE ORAL DAILY
Qty: 0 | Refills: 0 | Status: DISCONTINUED | OUTPATIENT
Start: 2019-04-24 | End: 2019-04-26

## 2019-04-24 RX ORDER — ACETAMINOPHEN 500 MG
1000 TABLET ORAL ONCE
Qty: 0 | Refills: 0 | Status: COMPLETED | OUTPATIENT
Start: 2019-04-24 | End: 2019-04-24

## 2019-04-24 RX ORDER — ACETAMINOPHEN 500 MG
1000 TABLET ORAL ONCE
Qty: 0 | Refills: 0 | Status: COMPLETED | OUTPATIENT
Start: 2019-04-24 | End: 2019-04-25

## 2019-04-24 RX ADMIN — SODIUM CHLORIDE 100 MILLILITER(S): 9 INJECTION, SOLUTION INTRAVENOUS at 21:34

## 2019-04-24 RX ADMIN — SODIUM CHLORIDE 30 MILLILITER(S): 9 INJECTION, SOLUTION INTRAVENOUS at 06:31

## 2019-04-24 RX ADMIN — HYDROMORPHONE HYDROCHLORIDE 0.5 MILLIGRAM(S): 2 INJECTION INTRAMUSCULAR; INTRAVENOUS; SUBCUTANEOUS at 22:03

## 2019-04-24 RX ADMIN — SODIUM CHLORIDE 100 MILLILITER(S): 9 INJECTION, SOLUTION INTRAVENOUS at 19:00

## 2019-04-24 RX ADMIN — HYDROMORPHONE HYDROCHLORIDE 0.5 MILLIGRAM(S): 2 INJECTION INTRAMUSCULAR; INTRAVENOUS; SUBCUTANEOUS at 23:20

## 2019-04-24 RX ADMIN — Medication 400 MILLIGRAM(S): at 20:00

## 2019-04-24 RX ADMIN — Medication 1000 MILLIGRAM(S): at 20:15

## 2019-04-24 NOTE — CHART NOTE - NSCHARTNOTEFT_GEN_A_CORE
POST-OPERATIVE NOTE    Subjective:  Patient is s/p robotic assisted laparoscopic distal gastrectomy with D2 regional lymphadenectomy with gastrojejunostomy. Patient denies any n/v, chest pain, or SOB. Pain is currently well controlled. Recovering appropriately.    Objective:  Vital Signs Last 24 Hrs  T(C): 36.8 (24 Apr 2019 17:00), Max: 37.1 (24 Apr 2019 05:39)  T(F): 98.2 (24 Apr 2019 17:00), Max: 98.8 (24 Apr 2019 15:15)  HR: 95 (24 Apr 2019 18:00) (90 - 106)  BP: 143/78 (24 Apr 2019 18:00) (128/74 - 149/77)  BP(mean): 94 (24 Apr 2019 18:00) (87 - 94)  RR: 12 (24 Apr 2019 18:00) (12 - 19)  SpO2: 100% (24 Apr 2019 18:00) (96% - 100%)  I&O's Detail    24 Apr 2019 07:01  -  24 Apr 2019 19:08  --------------------------------------------------------  IN:    lactated ringers.: 375 mL  Total IN: 375 mL    OUT:    Indwelling Catheter - Urethral: 1200 mL  Total OUT: 1200 mL    Total NET: -825 mL        enoxaparin Injectable 40    PAST MEDICAL & SURGICAL HISTORY:  Malignant neoplasm of stomach: s/p chemotherapy  Kidney stone on right side: removal  S/P inguinal hernia repair: right  [facility] stomach[facility]        Physical Exam:  General: NAD, resting comfortably in bed, NGT in place with minimal output  Pulmonary: Nonlabored breathing, no respiratory distress  Cardiovascular: RRR  Abdominal: soft, mildly distended, appropriately tender around incisions, port site dressings c/d/i  Extremities: WWP  : donohue in place draining clear/yellow urine      LABS:                        10.3   7.13  )-----------( 193      ( 24 Apr 2019 15:40 )             33.0     04-24    139  |  102  |  6<L>  ----------------------------<  198<H>  3.8   |  24  |  0.77    Ca    9.3      24 Apr 2019 15:40        CAPILLARY BLOOD GLUCOSE      POCT Blood Glucose.: 120 mg/dL (24 Apr 2019 05:58)      Radiology and Additional Studies:    Assessment:  The patient is a 58y Male who is now several hours post-op from a robotic assisted laparoscopic distal gastrectomy with D2 regional lymphadenectomy with gastrojejunostomy.     Plan:  - Pain control as needed  - lovenox for DVT ppx  - OOB and ambulating as tolerated  - F/u AM labs  - monitor NGT output  - DO NOT manipulate NGT  - monitor GI fxn

## 2019-04-24 NOTE — BRIEF OPERATIVE NOTE - OPERATION/FINDINGS
EGD, robot assisted laparoscopic distal gastrectomy with D2 regional lymphadenectomy with gastrojejunostomy

## 2019-04-25 RX ORDER — ACETAMINOPHEN 500 MG
1000 TABLET ORAL ONCE
Qty: 0 | Refills: 0 | Status: COMPLETED | OUTPATIENT
Start: 2019-04-25 | End: 2019-04-25

## 2019-04-25 RX ORDER — OXYCODONE HYDROCHLORIDE 5 MG/1
5 TABLET ORAL EVERY 4 HOURS
Qty: 0 | Refills: 0 | Status: DISCONTINUED | OUTPATIENT
Start: 2019-04-25 | End: 2019-04-25

## 2019-04-25 RX ORDER — OXYCODONE HYDROCHLORIDE 5 MG/1
10 TABLET ORAL EVERY 4 HOURS
Qty: 0 | Refills: 0 | Status: DISCONTINUED | OUTPATIENT
Start: 2019-04-25 | End: 2019-04-25

## 2019-04-25 RX ORDER — ACETAMINOPHEN 500 MG
650 TABLET ORAL EVERY 6 HOURS
Qty: 0 | Refills: 0 | Status: DISCONTINUED | OUTPATIENT
Start: 2019-04-25 | End: 2019-04-25

## 2019-04-25 RX ORDER — DEXTROSE MONOHYDRATE, SODIUM CHLORIDE, AND POTASSIUM CHLORIDE 50; .745; 4.5 G/1000ML; G/1000ML; G/1000ML
1000 INJECTION, SOLUTION INTRAVENOUS
Qty: 0 | Refills: 0 | Status: DISCONTINUED | OUTPATIENT
Start: 2019-04-25 | End: 2019-04-27

## 2019-04-25 RX ORDER — HYDROMORPHONE HYDROCHLORIDE 2 MG/ML
0.5 INJECTION INTRAMUSCULAR; INTRAVENOUS; SUBCUTANEOUS EVERY 4 HOURS
Qty: 0 | Refills: 0 | Status: DISCONTINUED | OUTPATIENT
Start: 2019-04-25 | End: 2019-04-26

## 2019-04-25 RX ADMIN — DEXTROSE MONOHYDRATE, SODIUM CHLORIDE, AND POTASSIUM CHLORIDE 100 MILLILITER(S): 50; .745; 4.5 INJECTION, SOLUTION INTRAVENOUS at 17:59

## 2019-04-25 RX ADMIN — Medication 400 MILLIGRAM(S): at 08:02

## 2019-04-25 RX ADMIN — ONDANSETRON 4 MILLIGRAM(S): 8 TABLET, FILM COATED ORAL at 08:02

## 2019-04-25 RX ADMIN — HYDROMORPHONE HYDROCHLORIDE 0.5 MILLIGRAM(S): 2 INJECTION INTRAMUSCULAR; INTRAVENOUS; SUBCUTANEOUS at 18:48

## 2019-04-25 RX ADMIN — ENOXAPARIN SODIUM 40 MILLIGRAM(S): 100 INJECTION SUBCUTANEOUS at 11:30

## 2019-04-25 RX ADMIN — SODIUM CHLORIDE 100 MILLILITER(S): 9 INJECTION, SOLUTION INTRAVENOUS at 11:30

## 2019-04-25 RX ADMIN — Medication 1000 MILLIGRAM(S): at 08:30

## 2019-04-25 RX ADMIN — Medication 1000 MILLIGRAM(S): at 02:47

## 2019-04-25 RX ADMIN — Medication 400 MILLIGRAM(S): at 23:57

## 2019-04-25 RX ADMIN — PANTOPRAZOLE SODIUM 40 MILLIGRAM(S): 20 TABLET, DELAYED RELEASE ORAL at 11:30

## 2019-04-25 RX ADMIN — HYDROMORPHONE HYDROCHLORIDE 0.5 MILLIGRAM(S): 2 INJECTION INTRAMUSCULAR; INTRAVENOUS; SUBCUTANEOUS at 19:20

## 2019-04-25 RX ADMIN — Medication 400 MILLIGRAM(S): at 02:32

## 2019-04-25 NOTE — PROGRESS NOTE ADULT - ASSESSMENT
58M with gastric mass s/p EGD, laparoscopic distal gastrectomy with D2 regional lymphadenectomy with gastrojejunostomy (4/24)    - pain control as needed  - OOB and ambulating as tolerated  - DO NOT manipulate NGT  - monitor NGT output  - monitor for GI fxn  - lovenox for DVT ppx  - may d/c donohue today and f/u TOV    D Team Surgery  [id number]

## 2019-04-25 NOTE — PROGRESS NOTE ADULT - SUBJECTIVE AND OBJECTIVE BOX
Surgery Progress Note    S: Patient seen and examined. No acute events overnight. patient underwent robotic assisted laparoscopic distal gastrectomy with D2 regional lymphadenectomy with gastrojejunostomy yesterday. patient tolerated the procedure well. patient denies n/v and pain is well controlled.     O:  Vital Signs Last 24 Hrs  T(C): 36.3 (25 Apr 2019 00:13), Max: 37.1 (24 Apr 2019 05:39)  T(F): 97.3 (25 Apr 2019 00:13), Max: 98.8 (24 Apr 2019 15:15)  HR: 85 (25 Apr 2019 00:13) (85 - 106)  BP: 119/65 (25 Apr 2019 00:13) (119/65 - 149/77)  BP(mean): 92 (24 Apr 2019 20:00) (87 - 96)  RR: 17 (25 Apr 2019 00:13) (12 - 19)  SpO2: 99% (25 Apr 2019 00:13) (96% - 100%)    I&O's Detail    24 Apr 2019 07:01  -  25 Apr 2019 00:25  --------------------------------------------------------  IN:    IV PiggyBack: 100 mL    lactated ringers.: 700 mL  Total IN: 800 mL    OUT:    Indwelling Catheter - Urethral: 2050 mL    Nasoenteral Tube: 75 mL  Total OUT: 2125 mL    Total NET: -1325 mL          MEDICATIONS  (STANDING):  acetaminophen  IVPB .. 1000 milliGRAM(s) IV Intermittent once  acetaminophen  IVPB .. 1000 milliGRAM(s) IV Intermittent once  enoxaparin Injectable 40 milliGRAM(s) SubCutaneous daily  lactated ringers. 1000 milliLiter(s) (100 mL/Hr) IV Continuous <Continuous>  pantoprazole  Injectable 40 milliGRAM(s) IV Push daily    MEDICATIONS  (PRN):  HYDROmorphone  Injectable 1 milliGRAM(s) IV Push every 4 hours PRN Severe Pain (7 - 10)  HYDROmorphone  Injectable 0.5 milliGRAM(s) IV Push every 10 minutes PRN Severe Pain (7 - 10)  ondansetron Injectable 4 milliGRAM(s) IV Push once PRN Nausea and/or Vomiting                            10.3   7.13  )-----------( 193      ( 24 Apr 2019 15:40 )             33.0       04-24    139  |  102  |  6<L>  ----------------------------<  198<H>  3.8   |  24  |  0.77    Ca    9.3      24 Apr 2019 15:40        Physical Exam:  General: NAD, resting comfortably in bed, NGT in place with minimal output  Pulmonary: Nonlabored breathing, no respiratory distress  Cardiovascular: RRR  Abdominal: soft, mildly distended, appropriately tender around incisions, port site dressings c/d/i  Extremities: WWP  : donohue in place draining clear/yellow urine

## 2019-04-26 LAB
ANION GAP SERPL CALC-SCNC: 13 MMO/L — SIGNIFICANT CHANGE UP (ref 7–14)
BUN SERPL-MCNC: 9 MG/DL — SIGNIFICANT CHANGE UP (ref 7–23)
CALCIUM SERPL-MCNC: 9.2 MG/DL — SIGNIFICANT CHANGE UP (ref 8.4–10.5)
CHLORIDE SERPL-SCNC: 103 MMOL/L — SIGNIFICANT CHANGE UP (ref 98–107)
CO2 SERPL-SCNC: 25 MMOL/L — SIGNIFICANT CHANGE UP (ref 22–31)
CREAT SERPL-MCNC: 0.79 MG/DL — SIGNIFICANT CHANGE UP (ref 0.5–1.3)
GLUCOSE SERPL-MCNC: 130 MG/DL — HIGH (ref 70–99)
HCT VFR BLD CALC: 32.6 % — LOW (ref 39–50)
HGB BLD-MCNC: 10.4 G/DL — LOW (ref 13–17)
MAGNESIUM SERPL-MCNC: 1.7 MG/DL — SIGNIFICANT CHANGE UP (ref 1.6–2.6)
MCHC RBC-ENTMCNC: 25.3 PG — LOW (ref 27–34)
MCHC RBC-ENTMCNC: 31.9 % — LOW (ref 32–36)
MCV RBC AUTO: 79.3 FL — LOW (ref 80–100)
NRBC # FLD: 0 K/UL — SIGNIFICANT CHANGE UP (ref 0–0)
PHOSPHATE SERPL-MCNC: 2.7 MG/DL — SIGNIFICANT CHANGE UP (ref 2.5–4.5)
PLATELET # BLD AUTO: 166 K/UL — SIGNIFICANT CHANGE UP (ref 150–400)
PMV BLD: 9.4 FL — SIGNIFICANT CHANGE UP (ref 7–13)
POTASSIUM SERPL-MCNC: 3.1 MMOL/L — LOW (ref 3.5–5.3)
POTASSIUM SERPL-SCNC: 3.1 MMOL/L — LOW (ref 3.5–5.3)
RBC # BLD: 4.11 M/UL — LOW (ref 4.2–5.8)
RBC # FLD: 20.5 % — HIGH (ref 10.3–14.5)
SODIUM SERPL-SCNC: 141 MMOL/L — SIGNIFICANT CHANGE UP (ref 135–145)
WBC # BLD: 6.24 K/UL — SIGNIFICANT CHANGE UP (ref 3.8–10.5)
WBC # FLD AUTO: 6.24 K/UL — SIGNIFICANT CHANGE UP (ref 3.8–10.5)

## 2019-04-26 RX ORDER — MAGNESIUM SULFATE 500 MG/ML
2 VIAL (ML) INJECTION ONCE
Qty: 0 | Refills: 0 | Status: COMPLETED | OUTPATIENT
Start: 2019-04-26 | End: 2019-04-26

## 2019-04-26 RX ORDER — OXYCODONE HYDROCHLORIDE 5 MG/1
10 TABLET ORAL EVERY 4 HOURS
Qty: 0 | Refills: 0 | Status: DISCONTINUED | OUTPATIENT
Start: 2019-04-26 | End: 2019-04-29

## 2019-04-26 RX ORDER — HYDROMORPHONE HYDROCHLORIDE 2 MG/ML
0.5 INJECTION INTRAMUSCULAR; INTRAVENOUS; SUBCUTANEOUS EVERY 4 HOURS
Qty: 0 | Refills: 0 | Status: DISCONTINUED | OUTPATIENT
Start: 2019-04-26 | End: 2019-04-26

## 2019-04-26 RX ORDER — POTASSIUM CHLORIDE 20 MEQ
10 PACKET (EA) ORAL
Qty: 0 | Refills: 0 | Status: COMPLETED | OUTPATIENT
Start: 2019-04-26 | End: 2019-04-26

## 2019-04-26 RX ORDER — PANTOPRAZOLE SODIUM 20 MG/1
40 TABLET, DELAYED RELEASE ORAL
Qty: 0 | Refills: 0 | Status: DISCONTINUED | OUTPATIENT
Start: 2019-04-26 | End: 2019-04-29

## 2019-04-26 RX ORDER — ACETAMINOPHEN 500 MG
650 TABLET ORAL EVERY 6 HOURS
Qty: 0 | Refills: 0 | Status: DISCONTINUED | OUTPATIENT
Start: 2019-04-26 | End: 2019-04-29

## 2019-04-26 RX ORDER — OXYCODONE HYDROCHLORIDE 5 MG/1
5 TABLET ORAL EVERY 4 HOURS
Qty: 0 | Refills: 0 | Status: DISCONTINUED | OUTPATIENT
Start: 2019-04-26 | End: 2019-04-29

## 2019-04-26 RX ADMIN — Medication 1000 MILLIGRAM(S): at 00:32

## 2019-04-26 RX ADMIN — Medication 50 GRAM(S): at 10:52

## 2019-04-26 RX ADMIN — DEXTROSE MONOHYDRATE, SODIUM CHLORIDE, AND POTASSIUM CHLORIDE 100 MILLILITER(S): 50; .745; 4.5 INJECTION, SOLUTION INTRAVENOUS at 04:49

## 2019-04-26 RX ADMIN — Medication 650 MILLIGRAM(S): at 21:35

## 2019-04-26 RX ADMIN — Medication 650 MILLIGRAM(S): at 11:15

## 2019-04-26 RX ADMIN — Medication 100 MILLIEQUIVALENT(S): at 10:51

## 2019-04-26 RX ADMIN — ENOXAPARIN SODIUM 40 MILLIGRAM(S): 100 INJECTION SUBCUTANEOUS at 12:36

## 2019-04-26 RX ADMIN — Medication 650 MILLIGRAM(S): at 21:01

## 2019-04-26 RX ADMIN — HYDROMORPHONE HYDROCHLORIDE 0.5 MILLIGRAM(S): 2 INJECTION INTRAMUSCULAR; INTRAVENOUS; SUBCUTANEOUS at 05:20

## 2019-04-26 RX ADMIN — Medication 100 MILLIEQUIVALENT(S): at 12:05

## 2019-04-26 RX ADMIN — Medication 100 MILLIEQUIVALENT(S): at 13:24

## 2019-04-26 RX ADMIN — HYDROMORPHONE HYDROCHLORIDE 0.5 MILLIGRAM(S): 2 INJECTION INTRAMUSCULAR; INTRAVENOUS; SUBCUTANEOUS at 04:49

## 2019-04-26 RX ADMIN — Medication 650 MILLIGRAM(S): at 10:52

## 2019-04-26 NOTE — PROGRESS NOTE ADULT - ASSESSMENT
58M with gastric mass s/p EGD, laparoscopic distal gastrectomy with D2 regional lymphadenectomy with gastrojejunostomy (4/24)    - pain control  - OOB and ambulating as tolerated  - remove NGT  - CLD  - monitor for GI fxn  - DVT ppx    D Team Surgery  [id number]

## 2019-04-26 NOTE — PROGRESS NOTE ADULT - SUBJECTIVE AND OBJECTIVE BOX
Surgery Progress Note      Subjective: Patient seen and examined. No acute events overnight.   donohue d/c'd    T(C): 36.7 (04-26-19 @ 04:48), Max: 36.7 (04-25-19 @ 12:30)  HR: 89 (04-26-19 @ 04:48) (77 - 98)  BP: 142/63 (04-26-19 @ 04:48) (118/64 - 142/63)  RR: 18 (04-26-19 @ 04:48) (18 - 18)  SpO2: 98% (04-26-19 @ 04:48) (96% - 100%)      04-25-19 @ 07:01  -  04-26-19 @ 07:00  --------------------------------------------------------  IN: 0 mL / OUT: 1800 mL / NET: -1800 mL    04-26-19 @ 07:01  -  04-26-19 @ 10:16  --------------------------------------------------------  IN: 0 mL / OUT: 850 mL / NET: -850 mL        Physical Exam:   General: NAD, NGT with bilious output  Abdomen: soft, mildly distended, appropriately tender    Labs:                          10.4   6.24  )-----------( 166      ( 26 Apr 2019 05:15 )             32.6     04-26    141  |  103  |  9   ----------------------------<  130<H>  3.1<L>   |  25  |  0.79    Ca    9.2      26 Apr 2019 05:15  Phos  2.7     04-26  Mg     1.7     04-26        Medications:     acetaminophen   Tablet .. 650 milliGRAM(s) Oral every 6 hours PRN  dextrose 5% + sodium chloride 0.45% with potassium chloride 20 mEq/L 1000 milliLiter(s) IV Continuous <Continuous>  enoxaparin Injectable 40 milliGRAM(s) SubCutaneous daily  magnesium sulfate  IVPB 2 Gram(s) IV Intermittent once  oxyCODONE    IR 5 milliGRAM(s) Oral every 4 hours PRN  oxyCODONE    IR 10 milliGRAM(s) Oral every 4 hours PRN  pantoprazole    Tablet 40 milliGRAM(s) Oral before breakfast  potassium chloride  10 mEq/100 mL IVPB 10 milliEquivalent(s) IV Intermittent every 1 hour      Radiographs: No new imaging

## 2019-04-26 NOTE — CONSULT NOTE ADULT - SUBJECTIVE AND OBJECTIVE BOX
HPI:  57y/o male scheduled for robotic distal gastrectomy, regional lymphadenectomy, upper endoscopy possible open on 4/24/2019. Patient is well known to me. Pt states, "had abdominal pain and anemia s/p endoscopy.  Identified gastric mass, bx positive for malignancy.   Underwent right chest mediport, s/p chemotherapy (FLOT) last dose 3 weeks ago.  Denies abdominal pain."     Underwent robotic assisted laparoscopic distal gastrectomy with D2 regional lymphadenectomy with gastrojejunostomy on 4/24/19.    REVIEW OF SYSTEMS:    CONSTITUTIONAL: No weakness, fevers or chills, weight loss  EYES/ENT: No visual changes, no throat pain   RESPIRATORY: No cough, wheezing, hemoptysis; No shortness of breath  CARDIOVASCULAR: No chest pain or palpitations  GASTROINTESTINAL: No abdominal, nausea, vomiting, or hematemesis; No diarrhea or constipation. No melena or hematochezia.  GENITOURINARY: No dysuria, frequency or hematuria  NEUROLOGICAL: No dizziness, numbness, or weakness  SKIN: No itching, burning, rashes, or lesions   All other review of systems is negative unless indicated above.    VITAL SIGNS:    T98 P89 /63, P18    PHYSICAL EXAM:     GENERAL: no acute distress  HEENT: NC/AT, EOMI, neck supple, MMM  RESPIRATORY: LCTAB/L, no rhonchi, rales, or wheezing  CARDIOVASCULAR: RRR, no murmurs, gallops, rubs  ABDOMINAL: surgical scar soft, slightly-tender, non-distended, positive bowel sounds   EXTREMITIES: no clubbing, cyanosis, or edema  NEUROLOGICAL: alert and oriented x 3, non-focal  LYMPHATIC: lymphatic: cervical, supraclavicular, axilla, inguinal  SKIN: no rashes or lesions   MUSCULOSKELETAL: no gross joint deformity                          10.4   6.24  )-----------( 166      ( 26 Apr 2019 05:15 )             32.6     04-26    141  |  103  |  9   ----------------------------<  130<H>  3.1<L>   |  25  |  0.79    Ca    9.2      26 Apr 2019 05:15  Phos  2.7     04-26  Mg     1.7     04-26        MEDICATIONS  (STANDING):  dextrose 5% + sodium chloride 0.45% with potassium chloride 20 mEq/L 1000 milliLiter(s) (50 mL/Hr) IV Continuous <Continuous>  enoxaparin Injectable 40 milliGRAM(s) SubCutaneous daily  magnesium sulfate  IVPB 2 Gram(s) IV Intermittent once  pantoprazole    Tablet 40 milliGRAM(s) Oral before breakfast  potassium chloride  10 mEq/100 mL IVPB 10 milliEquivalent(s) IV Intermittent every 1 hour

## 2019-04-27 LAB
ANION GAP SERPL CALC-SCNC: 13 MMO/L — SIGNIFICANT CHANGE UP (ref 7–14)
BUN SERPL-MCNC: 5 MG/DL — LOW (ref 7–23)
CALCIUM SERPL-MCNC: 9 MG/DL — SIGNIFICANT CHANGE UP (ref 8.4–10.5)
CHLORIDE SERPL-SCNC: 105 MMOL/L — SIGNIFICANT CHANGE UP (ref 98–107)
CO2 SERPL-SCNC: 23 MMOL/L — SIGNIFICANT CHANGE UP (ref 22–31)
CREAT SERPL-MCNC: 0.71 MG/DL — SIGNIFICANT CHANGE UP (ref 0.5–1.3)
GLUCOSE SERPL-MCNC: 105 MG/DL — HIGH (ref 70–99)
HCT VFR BLD CALC: 34.9 % — LOW (ref 39–50)
HGB BLD-MCNC: 10.8 G/DL — LOW (ref 13–17)
MAGNESIUM SERPL-MCNC: 1.8 MG/DL — SIGNIFICANT CHANGE UP (ref 1.6–2.6)
MCHC RBC-ENTMCNC: 25.2 PG — LOW (ref 27–34)
MCHC RBC-ENTMCNC: 30.9 % — LOW (ref 32–36)
MCV RBC AUTO: 81.4 FL — SIGNIFICANT CHANGE UP (ref 80–100)
NRBC # FLD: 0 K/UL — SIGNIFICANT CHANGE UP (ref 0–0)
PHOSPHATE SERPL-MCNC: 3.1 MG/DL — SIGNIFICANT CHANGE UP (ref 2.5–4.5)
PLATELET # BLD AUTO: 185 K/UL — SIGNIFICANT CHANGE UP (ref 150–400)
PMV BLD: 9.5 FL — SIGNIFICANT CHANGE UP (ref 7–13)
POTASSIUM SERPL-MCNC: 3.3 MMOL/L — LOW (ref 3.5–5.3)
POTASSIUM SERPL-SCNC: 3.3 MMOL/L — LOW (ref 3.5–5.3)
RBC # BLD: 4.29 M/UL — SIGNIFICANT CHANGE UP (ref 4.2–5.8)
RBC # FLD: 20 % — HIGH (ref 10.3–14.5)
SODIUM SERPL-SCNC: 141 MMOL/L — SIGNIFICANT CHANGE UP (ref 135–145)
WBC # BLD: 5.06 K/UL — SIGNIFICANT CHANGE UP (ref 3.8–10.5)
WBC # FLD AUTO: 5.06 K/UL — SIGNIFICANT CHANGE UP (ref 3.8–10.5)

## 2019-04-27 RX ORDER — POTASSIUM CHLORIDE 20 MEQ
40 PACKET (EA) ORAL ONCE
Qty: 0 | Refills: 0 | Status: COMPLETED | OUTPATIENT
Start: 2019-04-27 | End: 2019-04-27

## 2019-04-27 RX ADMIN — ENOXAPARIN SODIUM 40 MILLIGRAM(S): 100 INJECTION SUBCUTANEOUS at 12:25

## 2019-04-27 RX ADMIN — Medication 650 MILLIGRAM(S): at 05:20

## 2019-04-27 RX ADMIN — OXYCODONE HYDROCHLORIDE 5 MILLIGRAM(S): 5 TABLET ORAL at 09:19

## 2019-04-27 RX ADMIN — Medication 650 MILLIGRAM(S): at 21:15

## 2019-04-27 RX ADMIN — Medication 650 MILLIGRAM(S): at 04:49

## 2019-04-27 RX ADMIN — DEXTROSE MONOHYDRATE, SODIUM CHLORIDE, AND POTASSIUM CHLORIDE 50 MILLILITER(S): 50; .745; 4.5 INJECTION, SOLUTION INTRAVENOUS at 08:29

## 2019-04-27 RX ADMIN — OXYCODONE HYDROCHLORIDE 5 MILLIGRAM(S): 5 TABLET ORAL at 08:28

## 2019-04-27 RX ADMIN — Medication 40 MILLIEQUIVALENT(S): at 12:25

## 2019-04-27 RX ADMIN — PANTOPRAZOLE SODIUM 40 MILLIGRAM(S): 20 TABLET, DELAYED RELEASE ORAL at 04:49

## 2019-04-27 RX ADMIN — Medication 650 MILLIGRAM(S): at 21:45

## 2019-04-27 NOTE — PROGRESS NOTE ADULT - SUBJECTIVE AND OBJECTIVE BOX
Surgery Progress Note      Subjective: Patient seen and examined. No acute events overnight.   NGT removed    T(C): 36.2 (04-27-19 @ 00:44), Max: 37 (04-26-19 @ 20:53)  HR: 103 (04-27-19 @ 00:44) (88 - 103)  BP: 124/81 (04-27-19 @ 00:44) (123/82 - 143/79)  RR: 18 (04-27-19 @ 00:44) (18 - 18)  SpO2: 99% (04-27-19 @ 00:44) (97% - 100%)      04-25-19 @ 07:01  -  04-26-19 @ 07:00  --------------------------------------------------------  IN: 0 mL / OUT: 1800 mL / NET: -1800 mL    04-26-19 @ 07:01  -  04-27-19 @ 00:59  --------------------------------------------------------  IN: 0 mL / OUT: 3050 mL / NET: -3050 mL        Physical Exam:   General: NAD  Abdomen: soft, mildly distended, appropriately tender    Labs:                          10.4   6.24  )-----------( 166      ( 26 Apr 2019 05:15 )             32.6     04-26    141  |  103  |  9   ----------------------------<  130<H>  3.1<L>   |  25  |  0.79    Ca    9.2      26 Apr 2019 05:15  Phos  2.7     04-26  Mg     1.7     04-26        Medications:     acetaminophen   Tablet .. 650 milliGRAM(s) Oral every 6 hours PRN  dextrose 5% + sodium chloride 0.45% with potassium chloride 20 mEq/L 1000 milliLiter(s) IV Continuous <Continuous>  enoxaparin Injectable 40 milliGRAM(s) SubCutaneous daily  oxyCODONE    IR 5 milliGRAM(s) Oral every 4 hours PRN  oxyCODONE    IR 10 milliGRAM(s) Oral every 4 hours PRN  pantoprazole    Tablet 40 milliGRAM(s) Oral before breakfast      Radiographs: No new imaging Appendicitis with perforation  03/27/2017    Active  Sarina Camilo

## 2019-04-27 NOTE — PROGRESS NOTE ADULT - ASSESSMENT
58M with gastric mass s/p EGD, laparoscopic distal gastrectomy with D2 regional lymphadenectomy with gastrojejunostomy (4/24)    - pain control  - OOB and ambulating as tolerated  - CLD  - monitor for GI fxn  - DVT ppx    D Team Surgery  [id number]

## 2019-04-28 LAB
ANION GAP SERPL CALC-SCNC: 11 MMO/L — SIGNIFICANT CHANGE UP (ref 7–14)
BUN SERPL-MCNC: 5 MG/DL — LOW (ref 7–23)
CALCIUM SERPL-MCNC: 9.3 MG/DL — SIGNIFICANT CHANGE UP (ref 8.4–10.5)
CHLORIDE SERPL-SCNC: 103 MMOL/L — SIGNIFICANT CHANGE UP (ref 98–107)
CO2 SERPL-SCNC: 23 MMOL/L — SIGNIFICANT CHANGE UP (ref 22–31)
CREAT SERPL-MCNC: 0.7 MG/DL — SIGNIFICANT CHANGE UP (ref 0.5–1.3)
GLUCOSE SERPL-MCNC: 105 MG/DL — HIGH (ref 70–99)
HCT VFR BLD CALC: 35.7 % — LOW (ref 39–50)
HGB BLD-MCNC: 11.1 G/DL — LOW (ref 13–17)
MAGNESIUM SERPL-MCNC: 1.7 MG/DL — SIGNIFICANT CHANGE UP (ref 1.6–2.6)
MCHC RBC-ENTMCNC: 25.2 PG — LOW (ref 27–34)
MCHC RBC-ENTMCNC: 31.1 % — LOW (ref 32–36)
MCV RBC AUTO: 81 FL — SIGNIFICANT CHANGE UP (ref 80–100)
NRBC # FLD: 0 K/UL — SIGNIFICANT CHANGE UP (ref 0–0)
PHOSPHATE SERPL-MCNC: 3.8 MG/DL — SIGNIFICANT CHANGE UP (ref 2.5–4.5)
PLATELET # BLD AUTO: 178 K/UL — SIGNIFICANT CHANGE UP (ref 150–400)
PMV BLD: 9.1 FL — SIGNIFICANT CHANGE UP (ref 7–13)
POTASSIUM SERPL-MCNC: 3.5 MMOL/L — SIGNIFICANT CHANGE UP (ref 3.5–5.3)
POTASSIUM SERPL-SCNC: 3.5 MMOL/L — SIGNIFICANT CHANGE UP (ref 3.5–5.3)
RBC # BLD: 4.41 M/UL — SIGNIFICANT CHANGE UP (ref 4.2–5.8)
RBC # FLD: 19.6 % — HIGH (ref 10.3–14.5)
SODIUM SERPL-SCNC: 137 MMOL/L — SIGNIFICANT CHANGE UP (ref 135–145)
WBC # BLD: 5.56 K/UL — SIGNIFICANT CHANGE UP (ref 3.8–10.5)
WBC # FLD AUTO: 5.56 K/UL — SIGNIFICANT CHANGE UP (ref 3.8–10.5)

## 2019-04-28 RX ORDER — POTASSIUM CHLORIDE 20 MEQ
40 PACKET (EA) ORAL ONCE
Qty: 0 | Refills: 0 | Status: COMPLETED | OUTPATIENT
Start: 2019-04-28 | End: 2019-04-28

## 2019-04-28 RX ADMIN — PANTOPRAZOLE SODIUM 40 MILLIGRAM(S): 20 TABLET, DELAYED RELEASE ORAL at 05:09

## 2019-04-28 RX ADMIN — ENOXAPARIN SODIUM 40 MILLIGRAM(S): 100 INJECTION SUBCUTANEOUS at 12:23

## 2019-04-28 RX ADMIN — Medication 650 MILLIGRAM(S): at 16:27

## 2019-04-28 RX ADMIN — Medication 40 MILLIEQUIVALENT(S): at 07:14

## 2019-04-28 RX ADMIN — Medication 650 MILLIGRAM(S): at 17:15

## 2019-04-28 NOTE — PROGRESS NOTE ADULT - ASSESSMENT
58M with gastric mass s/p EGD, laparoscopic distal gastrectomy with D2 regional lymphadenectomy with gastrojejunostomy (4/24)    - pain control  - OOB and ambulating as tolerated  - CLD  - monitor for GI fxn  - DVT ppx    D Team Surgery  [id number] 58M with gastric mass s/p EGD, laparoscopic distal gastrectomy with D2 regional lymphadenectomy with gastrojejunostomy (4/24)    - pain control  - OOB and ambulating as tolerated  - advance to bariatric regular diet  - monitor for GI fxn  - DVT ppx    D Team Surgery  [id number]

## 2019-04-28 NOTE — PROGRESS NOTE ADULT - SUBJECTIVE AND OBJECTIVE BOX
Surgery Progress Note      Subjective: Patient seen and examined. No acute events overnight.   NGT removed    T(C): 36.2 (04-27-19 @ 00:44), Max: 37 (04-26-19 @ 20:53)  HR: 103 (04-27-19 @ 00:44) (88 - 103)  BP: 124/81 (04-27-19 @ 00:44) (123/82 - 143/79)  RR: 18 (04-27-19 @ 00:44) (18 - 18)  SpO2: 99% (04-27-19 @ 00:44) (97% - 100%)      04-25-19 @ 07:01  -  04-26-19 @ 07:00  --------------------------------------------------------  IN: 0 mL / OUT: 1800 mL / NET: -1800 mL    04-26-19 @ 07:01  -  04-27-19 @ 00:59  --------------------------------------------------------  IN: 0 mL / OUT: 3050 mL / NET: -3050 mL        Physical Exam:   General: NAD  Abdomen: soft, mildly distended, appropriately tender    Labs:                          10.4   6.24  )-----------( 166      ( 26 Apr 2019 05:15 )             32.6     04-26    141  |  103  |  9   ----------------------------<  130<H>  3.1<L>   |  25  |  0.79    Ca    9.2      26 Apr 2019 05:15  Phos  2.7     04-26  Mg     1.7     04-26        Medications:     acetaminophen   Tablet .. 650 milliGRAM(s) Oral every 6 hours PRN  dextrose 5% + sodium chloride 0.45% with potassium chloride 20 mEq/L 1000 milliLiter(s) IV Continuous <Continuous>  enoxaparin Injectable 40 milliGRAM(s) SubCutaneous daily  oxyCODONE    IR 5 milliGRAM(s) Oral every 4 hours PRN  oxyCODONE    IR 10 milliGRAM(s) Oral every 4 hours PRN  pantoprazole    Tablet 40 milliGRAM(s) Oral before breakfast      Radiographs: No new imaging Surgery Progress Note      Subjective: Patient seen and examined. No acute events overnight. - flatus, - BM but no n/v. feels hungry.     T(C): 36.2 (04-27-19 @ 00:44), Max: 37 (04-26-19 @ 20:53)  HR: 103 (04-27-19 @ 00:44) (88 - 103)  BP: 124/81 (04-27-19 @ 00:44) (123/82 - 143/79)  RR: 18 (04-27-19 @ 00:44) (18 - 18)  SpO2: 99% (04-27-19 @ 00:44) (97% - 100%)      04-25-19 @ 07:01  -  04-26-19 @ 07:00  --------------------------------------------------------  IN: 0 mL / OUT: 1800 mL / NET: -1800 mL    04-26-19 @ 07:01  -  04-27-19 @ 00:59  --------------------------------------------------------  IN: 0 mL / OUT: 3050 mL / NET: -3050 mL        Physical Exam:   General: NAD  Abdomen: soft, mildly distended, appropriately tender, no rebound or guarding    Labs:                          10.4   6.24  )-----------( 166      ( 26 Apr 2019 05:15 )             32.6     04-26    141  |  103  |  9   ----------------------------<  130<H>  3.1<L>   |  25  |  0.79    Ca    9.2      26 Apr 2019 05:15  Phos  2.7     04-26  Mg     1.7     04-26        Medications:     acetaminophen   Tablet .. 650 milliGRAM(s) Oral every 6 hours PRN  dextrose 5% + sodium chloride 0.45% with potassium chloride 20 mEq/L 1000 milliLiter(s) IV Continuous <Continuous>  enoxaparin Injectable 40 milliGRAM(s) SubCutaneous daily  oxyCODONE    IR 5 milliGRAM(s) Oral every 4 hours PRN  oxyCODONE    IR 10 milliGRAM(s) Oral every 4 hours PRN  pantoprazole    Tablet 40 milliGRAM(s) Oral before breakfast      Radiographs: No new imaging

## 2019-04-29 ENCOUNTER — TRANSCRIPTION ENCOUNTER (OUTPATIENT)
Age: 59
End: 2019-04-29

## 2019-04-29 VITALS
RESPIRATION RATE: 18 BRPM | DIASTOLIC BLOOD PRESSURE: 82 MMHG | OXYGEN SATURATION: 99 % | TEMPERATURE: 98 F | SYSTOLIC BLOOD PRESSURE: 122 MMHG | HEART RATE: 96 BPM

## 2019-04-29 DIAGNOSIS — R11.2 NAUSEA WITH VOMITING, UNSPECIFIED: ICD-10-CM

## 2019-04-29 LAB
ANION GAP SERPL CALC-SCNC: 13 MMO/L — SIGNIFICANT CHANGE UP (ref 7–14)
BUN SERPL-MCNC: 8 MG/DL — SIGNIFICANT CHANGE UP (ref 7–23)
CALCIUM SERPL-MCNC: 9.4 MG/DL — SIGNIFICANT CHANGE UP (ref 8.4–10.5)
CHLORIDE SERPL-SCNC: 100 MMOL/L — SIGNIFICANT CHANGE UP (ref 98–107)
CO2 SERPL-SCNC: 22 MMOL/L — SIGNIFICANT CHANGE UP (ref 22–31)
CREAT SERPL-MCNC: 0.73 MG/DL — SIGNIFICANT CHANGE UP (ref 0.5–1.3)
GLUCOSE SERPL-MCNC: 108 MG/DL — HIGH (ref 70–99)
HCT VFR BLD CALC: 35.9 % — LOW (ref 39–50)
HGB BLD-MCNC: 11.3 G/DL — LOW (ref 13–17)
MAGNESIUM SERPL-MCNC: 1.7 MG/DL — SIGNIFICANT CHANGE UP (ref 1.6–2.6)
MCHC RBC-ENTMCNC: 25.4 PG — LOW (ref 27–34)
MCHC RBC-ENTMCNC: 31.5 % — LOW (ref 32–36)
MCV RBC AUTO: 80.7 FL — SIGNIFICANT CHANGE UP (ref 80–100)
NRBC # FLD: 0 K/UL — SIGNIFICANT CHANGE UP (ref 0–0)
PHOSPHATE SERPL-MCNC: 3.7 MG/DL — SIGNIFICANT CHANGE UP (ref 2.5–4.5)
PLATELET # BLD AUTO: 193 K/UL — SIGNIFICANT CHANGE UP (ref 150–400)
PMV BLD: 9.5 FL — SIGNIFICANT CHANGE UP (ref 7–13)
POTASSIUM SERPL-MCNC: 3.6 MMOL/L — SIGNIFICANT CHANGE UP (ref 3.5–5.3)
POTASSIUM SERPL-SCNC: 3.6 MMOL/L — SIGNIFICANT CHANGE UP (ref 3.5–5.3)
RBC # BLD: 4.45 M/UL — SIGNIFICANT CHANGE UP (ref 4.2–5.8)
RBC # FLD: 19.5 % — HIGH (ref 10.3–14.5)
SODIUM SERPL-SCNC: 135 MMOL/L — SIGNIFICANT CHANGE UP (ref 135–145)
WBC # BLD: 6.57 K/UL — SIGNIFICANT CHANGE UP (ref 3.8–10.5)
WBC # FLD AUTO: 6.57 K/UL — SIGNIFICANT CHANGE UP (ref 3.8–10.5)

## 2019-04-29 RX ORDER — MAGNESIUM SULFATE 500 MG/ML
2 VIAL (ML) INJECTION ONCE
Qty: 0 | Refills: 0 | Status: DISCONTINUED | OUTPATIENT
Start: 2019-04-29 | End: 2019-04-29

## 2019-04-29 RX ORDER — POTASSIUM CHLORIDE 20 MEQ
40 PACKET (EA) ORAL ONCE
Qty: 0 | Refills: 0 | Status: COMPLETED | OUTPATIENT
Start: 2019-04-29 | End: 2019-04-29

## 2019-04-29 RX ORDER — OXYCODONE HYDROCHLORIDE 5 MG/1
1 TABLET ORAL
Qty: 12 | Refills: 0 | OUTPATIENT
Start: 2019-04-29 | End: 2019-04-30

## 2019-04-29 RX ORDER — PANTOPRAZOLE SODIUM 20 MG/1
1 TABLET, DELAYED RELEASE ORAL
Qty: 10 | Refills: 0 | OUTPATIENT
Start: 2019-04-29 | End: 2019-05-08

## 2019-04-29 RX ORDER — POTASSIUM CHLORIDE 20 MEQ
10 PACKET (EA) ORAL
Qty: 0 | Refills: 0 | Status: DISCONTINUED | OUTPATIENT
Start: 2019-04-29 | End: 2019-04-29

## 2019-04-29 RX ORDER — ACETAMINOPHEN 500 MG
2 TABLET ORAL
Qty: 0 | Refills: 0 | COMMUNITY
Start: 2019-04-29

## 2019-04-29 RX ADMIN — Medication 40 MILLIEQUIVALENT(S): at 08:33

## 2019-04-29 RX ADMIN — PANTOPRAZOLE SODIUM 40 MILLIGRAM(S): 20 TABLET, DELAYED RELEASE ORAL at 05:00

## 2019-04-29 NOTE — DISCHARGE NOTE PROVIDER - NSDCCPCAREPLAN_GEN_ALL_CORE_FT
PRINCIPAL DISCHARGE DIAGNOSIS  Diagnosis: Gastric mass  Assessment and Plan of Treatment: PRINCIPAL DISCHARGE DIAGNOSIS  Diagnosis: Gastric mass  Assessment and Plan of Treatment:       SECONDARY DISCHARGE DIAGNOSES  Diagnosis: Cancer of lymph nodes, secondary  Assessment and Plan of Treatment:

## 2019-04-29 NOTE — PROGRESS NOTE ADULT - ASSESSMENT
58M with gastric mass s/p EGD, laparoscopic distal gastrectomy with D2 regional lymphadenectomy with gastrojejunostomy (4/24)    - pain control  - OOB and ambulating as tolerated  - c/w bariatric regular diet as tolerated  - monitor for GI fxn  - DVT ppx    D Team Surgery  [id number] 58M with gastric mass s/p EGD, laparoscopic distal gastrectomy with D2 regional lymphadenectomy with gastrojejunostomy (4/24)    - pain control  - OOB and ambulating as tolerated  - c/w bariatric regular diet as tolerated  - monitor for GI fxn  - DVT ppx  - poss DC today    D Team Surgery  [id number]

## 2019-04-29 NOTE — PROGRESS NOTE ADULT - SUBJECTIVE AND OBJECTIVE BOX
Surgery Progress Note    S: Patient seen and examined. No acute events overnight. patient was advanced to bariatric regular diet yesterday but did have some nausea. + flatus, - BM. pain is well controlled.     O:  Vital Signs Last 24 Hrs  T(C): 37 (29 Apr 2019 00:03), Max: 37.2 (28 Apr 2019 16:28)  T(F): 98.6 (29 Apr 2019 00:03), Max: 98.9 (28 Apr 2019 16:28)  HR: 93 (29 Apr 2019 00:03) (92 - 101)  BP: 110/66 (29 Apr 2019 00:03) (110/66 - 144/95)  BP(mean): 101 (28 Apr 2019 12:23) (101 - 104)  RR: 18 (29 Apr 2019 00:03) (17 - 18)  SpO2: 99% (29 Apr 2019 00:03) (98% - 100%)    I&O's Detail    27 Apr 2019 07:01  -  28 Apr 2019 07:00  --------------------------------------------------------  IN:    dextrose 5% + sodium chloride 0.45% with potassium chloride 20 mEq/L: 600 mL    Oral Fluid: 960 mL  Total IN: 1560 mL    OUT:    Voided: 2140 mL  Total OUT: 2140 mL    Total NET: -580 mL      28 Apr 2019 07:01  -  29 Apr 2019 00:14  --------------------------------------------------------  IN:    Oral Fluid: 240 mL  Total IN: 240 mL    OUT:    Voided: 1675 mL  Total OUT: 1675 mL    Total NET: -1435 mL          MEDICATIONS  (STANDING):  enoxaparin Injectable 40 milliGRAM(s) SubCutaneous daily  pantoprazole    Tablet 40 milliGRAM(s) Oral before breakfast    MEDICATIONS  (PRN):  acetaminophen   Tablet .. 650 milliGRAM(s) Oral every 6 hours PRN Mild Pain (1 - 3)  oxyCODONE    IR 5 milliGRAM(s) Oral every 4 hours PRN Moderate Pain (4 - 6)  oxyCODONE    IR 10 milliGRAM(s) Oral every 4 hours PRN Severe Pain (7 - 10)                            11.1   5.56  )-----------( 178      ( 28 Apr 2019 05:32 )             35.7       04-28    137  |  103  |  5<L>  ----------------------------<  105<H>  3.5   |  23  |  0.70    Ca    9.3      28 Apr 2019 05:32  Phos  3.8     04-28  Mg     1.7     04-28        Physical Exam:  Gen: Laying in bed, NAD  Resp: Unlabored breathing  Abd: soft, NT, mildly distended, no rebound or guarding  Ext: WWP

## 2019-04-29 NOTE — DISCHARGE NOTE NURSING/CASE MANAGEMENT/SOCIAL WORK - NSDCDPATPORTLINK_GEN_ALL_CORE
You can access the [facility], offered by [facility], by registering with the following website: [email or web address]

## 2019-04-29 NOTE — DISCHARGE NOTE PROVIDER - HOSPITAL COURSE
57y/o male scheduled for robotic distal gastrectomy, regional lymphadenectomy, upper endoscopy possible open on 4/24/2019.  Pt states, "had abdominal pain and anemia s/p endoscopy.  Identified gastric mass, bx positive for malignancy. Underwent right chest mediport, s/p chemotherapy last dose 3 weeks ago.  Denies abdominal pain."         Pt went to OR on 4/24 for robot assisted laparoscopic distal gastrectomy with D2 regional lymphadenectomy with gastrojejunostomy. On 4/26 NGT was removed and patients diet was slowly advanced as tolerated.  At this time, pt is tolerating a regular diet, ambulating and voiding.  Pt has been deemed stable for discharge at this time.

## 2019-04-29 NOTE — DISCHARGE NOTE PROVIDER - NSDCFUADDINST_GEN_ALL_CORE_FT
WOUND CARE:  Please keep incisions clean and dry. Please do not Scrub or rub incisions. Do not use lotion or powder on incisions.   BATHING: You may shower and/or sponge bathe. You may use warm soapy water in the shower and rinse, pat dry.  ACTIVITY: No heavy lifting or straining. Otherwise, you may return to your usual level of physical activity. If you are taking narcotic pain medication DO NOT drive a car, operate machinery or make important decisions.  DIET: Return to your usual diet.  NOTIFY YOUR SURGEON IF: You have any bleeding that does not stop, any pus draining from your wound(s), increased pain at surgical site, any fever (over 100.4 F) persistent nausea/vomiting, or if your pain is not controlled on your discharge pain medications.  Please follow up with your primary care physician in 1-2 weeks regarding your hospitalization.  Please follow up with your surgeon, Dr. Peter in 1 week. Please call office to make an appointment.

## 2019-04-29 NOTE — PROGRESS NOTE ADULT - SUBJECTIVE AND OBJECTIVE BOX
On bariatric diet, slight + nausea\    REVIEW OF SYSTEMS:    CONSTITUTIONAL: No weakness, fevers or chills, weight loss  EYES/ENT: No visual changes, no throat pain   RESPIRATORY: No cough, wheezing, hemoptysis; No shortness of breath  CARDIOVASCULAR: No chest pain or palpitations  GASTROINTESTINAL: No abdominal, nausea, vomiting, or hematemesis; No diarrhea or constipation. No melena or hematochezia.      VITAL SIGNS:    T 98.1 P 94 /80 RR 18    PHYSICAL EXAM:     GENERAL: no acute distress  HEENT: NC/AT, EOMI, neck supple, MMM  RESPIRATORY: LCTAB/L, no rhonchi, rales, or wheezing  CARDIOVASCULAR: RRR, no murmurs, gallops, rubs  ABDOMINAL: soft, non-tender, non-distended, positive bowel sounds   EXTREMITIES: no clubbing, cyanosis, or edema  LYMPHATIC: lymphatic: cervical, supraclavicular, axilla, inguinal                        11.3   6.57  )-----------( 193      ( 29 Apr 2019 05:47 )             35.9     04-29    135  |  100  |  8   ----------------------------<  108<H>  3.6   |  22  |  0.73    Ca    9.4      29 Apr 2019 05:47  Phos  3.7     04-29  Mg     1.7     04-29        MEDICATIONS  (STANDING):  enoxaparin Injectable 40 milliGRAM(s) SubCutaneous daily  pantoprazole    Tablet 40 milliGRAM(s) Oral before breakfast

## 2019-04-29 NOTE — DISCHARGE NOTE PROVIDER - CARE PROVIDER_API CALL
Trey Peter)  Surgery; Surgical Oncology  [address] [place] [address]  Phone: [phone]  Fax: [phone]  Follow Up Time: 1 week

## 2019-05-14 ENCOUNTER — APPOINTMENT (OUTPATIENT)
Dept: SURGICAL ONCOLOGY | Facility: CLINIC | Age: 59
End: 2019-05-14
Payer: COMMERCIAL

## 2019-05-14 VITALS
HEART RATE: 88 BPM | OXYGEN SATURATION: 99 % | DIASTOLIC BLOOD PRESSURE: 81 MMHG | TEMPERATURE: 98.1 F | SYSTOLIC BLOOD PRESSURE: 127 MMHG

## 2019-05-14 PROCEDURE — 99024 POSTOP FOLLOW-UP VISIT: CPT

## 2019-06-07 ENCOUNTER — APPOINTMENT (OUTPATIENT)
Dept: NEUROLOGY | Facility: CLINIC | Age: 59
End: 2019-06-07
Payer: COMMERCIAL

## 2019-06-07 VITALS
WEIGHT: 130 LBS | SYSTOLIC BLOOD PRESSURE: 128 MMHG | HEART RATE: 82 BPM | HEIGHT: 62 IN | DIASTOLIC BLOOD PRESSURE: 80 MMHG | BODY MASS INDEX: 23.92 KG/M2

## 2019-06-07 PROCEDURE — 99214 OFFICE O/P EST MOD 30 MIN: CPT

## 2019-06-07 NOTE — HISTORY OF PRESENT ILLNESS
[FreeTextEntry1] : Patient states he is feeling stronger and walking better.  \par \par Labs were normal.  He has some joint pain.  Hurts when he swallows but denies coughing on liquids, states he has excess saliva.  No SOB.  Denies muscle cramps or twitching. \par \par

## 2019-06-07 NOTE — PHYSICAL EXAM
[Person] : oriented to person [Place] : oriented to place [Time] : oriented to time [Short Term Intact] : short term memory intact [Remote Intact] : remote memory intact [Registration Intact] : recent registration memory intact [Concentration Intact] : normal concentrating ability [Visual Intact] : visual attention was ~T not ~L decreased [Naming Objects] : no difficulty naming common objects [Repeating Phrases] : no difficulty repeating a phrase [Writing A Sentence] : no difficulty writing a sentence [Fluency] : fluency intact [Comprehension] : comprehension intact [Reading] : reading intact [Past History] : adequate knowledge of personal past history [Cranial Nerves Oculomotor (III)] : extraocular motion intact [Cranial Nerves Optic (II)] : visual acuity intact bilaterally,  visual fields full to confrontation, pupils equal round and reactive to light [Cranial Nerves Trigeminal (V)] : facial sensation intact symmetrically [Cranial Nerves Vestibulocochlear (VIII)] : hearing was intact bilaterally [Cranial Nerves Facial (VII)] : face symmetrical [Cranial Nerves Accessory (XI - Cranial And Spinal)] : head turning and shoulder shrug symmetric [Cranial Nerves Glossopharyngeal (IX)] : tongue and palate midline [Cranial Nerves Hypoglossal (XII)] : there was no tongue deviation with protrusion [Motor Tone] : muscle tone was normal in all four extremities [No Muscle Atrophy] : normal bulk in all four extremities [4] : arm extension  4/5 [Hand Weakness Right] : normal hand  [Hand Weakness Left] : normal hand  [+4] : hip flexion +4/5 [5] : ankle plantar flexion 5/5 [Sensation Tactile Decrease] : light touch was intact [Sensation Vibration Decrease] : vibration was intact [Proprioception] : proprioception was intact [Abnormal Walk] : normal gait [Past-pointing] : there was no past-pointing [Balance] : balance was intact [Tremor] : no tremor present [4+] : Ankle jerk left 4+ [Plantar Reflex Right Only] : abnormal on the right [Plantar Reflex Left Only] : abnormal on the left [FreeTextEntry6] : Limited shoulder ROM. Bilateral pectoral and deltoid jerks [FreeTextEntry9] : crossed adduction and suprapatellars

## 2019-06-07 NOTE — ASSESSMENT
[FreeTextEntry1] : Patient now has subtle weakness in UE's and right foot DF, with persistent 4+ DTR's and upgoing toes.  \par \par With the prior EMG/NCV findings, this is probable ALS.  Will return in two months for EMG and will make definite diagnosis at that time.  Would like to see progression of fasciculations or bulbar involvement prior to final diagnosis and starting riluzole. \par \par

## 2019-08-13 ENCOUNTER — APPOINTMENT (OUTPATIENT)
Dept: SURGICAL ONCOLOGY | Facility: CLINIC | Age: 59
End: 2019-08-13
Payer: COMMERCIAL

## 2019-08-13 VITALS
OXYGEN SATURATION: 99 % | RESPIRATION RATE: 18 BRPM | HEART RATE: 80 BPM | WEIGHT: 128 LBS | BODY MASS INDEX: 23.55 KG/M2 | HEIGHT: 62 IN | DIASTOLIC BLOOD PRESSURE: 71 MMHG | SYSTOLIC BLOOD PRESSURE: 110 MMHG | TEMPERATURE: 98 F

## 2019-08-13 PROCEDURE — 99214 OFFICE O/P EST MOD 30 MIN: CPT

## 2019-08-13 NOTE — HISTORY OF PRESENT ILLNESS
[de-identified] : 58 year old male presents for a 3 month follow up visit. \par Initially consulted 1/8/19 for newly diagnosed gastric cancer, referred by Dr. Nguyen. \par \par The patient was found to have iron deficiency anemia and was experiencing right abdominal pain and was referred for an endoscopy on 12/21/18. On endoscopy he was found to have a large antral mass, about 4 cm, with central umbilication, found in the antrum, next to the incisura.  Pathology of the stomach antrum mass showed invasive moderately differentiated adenocarcinoma, negative for H. Pylori. \par \par Subsequently, he underwent a CT of the abdomen and pelvis on 12/28/18 which showed a mass in the gastric body near the antrum measuring 3.8 x 1.47 cm in size, mildly enlarged gastrohepatic ligament lymph nodes, enlarged spleen measuring 15.2 cm, numerous bilateral nonobstructing renal stones, bilateral renal stones, cholelithiasis. \par \par PMH notable for kidney stones s/p kidney stone extraction, right inguinal hernia repair, arthritis. Never a smoker, denies alcohol use.  Denies family history of malignancies. \par \par Today he is with c/o occasional right upper quadrant abdominal pain which is worse after eating x 1 week.  He denies nausea, vomiting or bowel habit changes. He states he was started on iron supplements and since then his stools have been very dark and he has been constipated. Denies BRBPR, passing flatus. \par \par INTERVAL HISTORY:\par 1/29/19 -S/p EUS with Dr. Macdonald - Found to have a 36 mm x 26 mm hypoechoic heterogenous lesion in the gastric antrum extending into the serosa. A 1 cm lymph node was seen along the gastrohepatic ligament. This was staged T3N1 by EUS Criteria. \par \par 2/5/19 - The patient is scheduled for a Mediport placement tomorrow. He will begin chemotherapy 5 days after Mediport placement under the care of Dr. Barrera.  Today he is with c/o mild, occasional mid abdominal discomfort. No other changes. \par \par 4/9/19 - Finished 4 cycles of FLOT with Dr. Barrera.\par \par 4/24/19 - s/p robotic assisted laparoscopic distal gastrectomy with Billroth II reconstruction, regional lymphadenectomy and esophagogastroduodenoscopy. \par \par Final pathology:\par 1) Common hepatic artery lymph node excision: 1/4 lymph nodes positive for metastatic adenocarcinoma\par 2) Right gastroepiploic lymph node: 0/1\par 3) Distal gastrectomy:  2.0 cm invasive moderately differentiated adenocarcinoma, carcinoma invades the muscularis propria, negative margins, 1/23 lymph nodes, Tumor stage: pT2 N1\par 4) Celiac and proximal splenic lymph nodes: 0/10\par \par 5/14/19 - Feeling well with no complaints. \par \par 8/13/19 -

## 2019-08-13 NOTE — CONSULT LETTER
[Dear  ___] : Dear  [unfilled], [Consult Letter:] : I had the pleasure of evaluating your patient, [unfilled]. [Please see my note below.] : Please see my note below. [Consult Closing:] : Thank you very much for allowing me to participate in the care of this patient.  If you have any questions, please do not hesitate to contact me. [Sincerely,] : Sincerely, [DrArleen  ___] : Dr. GAMEZ [FreeTextEntry3] : Trey Peter MD, MPH, FACS\par Surgical Oncology\par Assistant Professor of Surgery\par [employer] [facility] [employer] [facility]

## 2019-08-13 NOTE — ASSESSMENT
[FreeTextEntry1] : 58 year old male presents for a follow up visit. Initially consulted 1/8/19 for newly diagnosed gastric cancer, referred by Dr. Nguyen.  The patient was found to have iron deficiency anemia and was experiencing right abdominal pain and was referred for an endoscopy on 12/21/18. On endoscopy he was found to have a large antral mass, about 4 cm, with central umbilication, found in the antrum, next to the incisura.  Pathology of the stomach antrum mass showed invasive moderately differentiated adenocarcinoma, negative for H. Pylori.  Subsequently, he underwent a CT of the abdomen and pelvis on 12/28/18 which showed a mass in the gastric body near the antrum measuring 3.8 x 1.47 cm in size, mildly enlarged gastrohepatic ligament lymph nodes, enlarged spleen measuring 15.2 cm, numerous bilateral nonobstructing renal stones, bilateral renal stones, cholelithiasis. \par \par S/p EUS with Dr. Macdonald - Found to have a 36 mm x 26 mm hypoechoic heterogenous lesion in the gastric antrum extending into the serosa. A 1 cm lymph node was seen along the gastrohepatic ligament. This was staged T3N1 by EUS Criteria.    He is s/p 4 cycles of FLOT with Dr. Barrera. \par \par 4/24/19 - s/p robotic assisted laparoscopic distal gastrectomy with Billroth II reconstruction, regional lymphadenectomy and esophagogastroduodenoscopy. \par \par Final pathology:\par 1) Common hepatic artery lymph node excision: 1/4 lymph nodes positive for metastatic adenocarcinoma\par 2) Right gastroepiploic lymph node: 0/1\par 3) Distal gastrectomy:  2.0 cm invasive moderately differentiated adenocarcinoma, carcinoma invades the muscularis propria, negative margins, 1/23 lymph nodes, Tumor stage: pT2 N1\par 4) Celiac and proximal splenic lymph nodes: 0/10\par \par PLAN:\par 1) Continue increase PO intake / protein\par 2) Finished chemo\par 3) RTO in 3 months\par 4) Imaging at 6 months and 1 year

## 2019-08-13 NOTE — REASON FOR VISIT
[Follow-Up Visit] : a follow-up visit for [Gastric Cancer] : gastric cancer [FreeTextEntry2] : 4/24/19 - s/p robotic assisted laparoscopic distal gastrectomy with Billroth II reconstruction, regional lymphadenectomy and esophagogastroduodenoscopy.

## 2019-08-26 ENCOUNTER — APPOINTMENT (OUTPATIENT)
Dept: NEUROLOGY | Facility: CLINIC | Age: 59
End: 2019-08-26

## 2019-08-29 ENCOUNTER — APPOINTMENT (OUTPATIENT)
Dept: NEUROLOGY | Facility: CLINIC | Age: 59
End: 2019-08-29
Payer: COMMERCIAL

## 2019-08-29 DIAGNOSIS — G12.20 MOTOR NEURON DISEASE, UNSPECIFIED: ICD-10-CM

## 2019-08-29 LAB — CK SERPL-CCNC: 34 U/L

## 2019-08-29 PROCEDURE — 95908 NRV CNDJ TST 3-4 STUDIES: CPT

## 2019-08-29 PROCEDURE — 99213 OFFICE O/P EST LOW 20 MIN: CPT | Mod: 25

## 2019-08-29 PROCEDURE — 95885 MUSC TST DONE W/NERV TST LIM: CPT

## 2019-08-29 NOTE — HISTORY OF PRESENT ILLNESS
[FreeTextEntry1] : Patient states he is about the same, no trouble swallowing, no SOB.  States walking is the same, denies progression of weakness.

## 2019-08-29 NOTE — ASSESSMENT
[FreeTextEntry1] : Patient's motor weakness has not changed, but the DTR's are coming down in the LE's and the toes are still up. \par \par EMG/NCV today shows significant drop in left peroneal and tibial motor amplitudes, and the needle exam is essentially the same with some spontaneous activity and fascics in most muscles.  No tongue fascics are yet noted and there are no bulbar signs. \par \par He remains in the probably ALS category by criteria if the EMG and NCV findings are taken together with the exam. \par \par Will check CPK today and see patient in 4 months and if there is progression of weakness at that visit will make diagnosis of ALS and start riluzole.

## 2019-08-29 NOTE — PHYSICAL EXAM
[Person] : oriented to person [Place] : oriented to place [Time] : oriented to time [Short Term Intact] : short term memory intact [Concentration Intact] : normal concentrating ability [Registration Intact] : recent registration memory intact [Remote Intact] : remote memory intact [Naming Objects] : no difficulty naming common objects [Visual Intact] : visual attention was ~T not ~L decreased [Repeating Phrases] : no difficulty repeating a phrase [Writing A Sentence] : no difficulty writing a sentence [Comprehension] : comprehension intact [Fluency] : fluency intact [Reading] : reading intact [Past History] : adequate knowledge of personal past history [Cranial Nerves Oculomotor (III)] : extraocular motion intact [Cranial Nerves Optic (II)] : visual acuity intact bilaterally,  visual fields full to confrontation, pupils equal round and reactive to light [Cranial Nerves Trigeminal (V)] : facial sensation intact symmetrically [Cranial Nerves Facial (VII)] : face symmetrical [Cranial Nerves Vestibulocochlear (VIII)] : hearing was intact bilaterally [Cranial Nerves Accessory (XI - Cranial And Spinal)] : head turning and shoulder shrug symmetric [Cranial Nerves Glossopharyngeal (IX)] : tongue and palate midline [Motor Tone] : muscle tone was normal in all four extremities [No Muscle Atrophy] : normal bulk in all four extremities [Cranial Nerves Hypoglossal (XII)] : there was no tongue deviation with protrusion [4] : arm extension  4/5 [+4] : hip flexion +4/5 [5] : ankle plantar flexion 5/5 [Sensation Tactile Decrease] : light touch was intact [Sensation Vibration Decrease] : vibration was intact [Proprioception] : proprioception was intact [Abnormal Walk] : normal gait [Balance] : balance was intact [4+] : Brachioradialis left 4+ [3+] : Patella left 3+ [2+] : Ankle jerk left 2+ [Plantar Reflex Right Only] : abnormal on the right [Plantar Reflex Left Only] : abnormal on the left [Hand Weakness Right] : normal hand  [Hand Weakness Left] : normal hand  [Past-pointing] : there was no past-pointing [Tremor] : no tremor present [FreeTextEntry6] : Limited shoulder ROM. Bilateral pectoral and deltoid jerks [FreeTextEntry9] : crossed adduction and suprapatellars

## 2019-08-30 NOTE — PROGRESS NOTE ADULT - REASON FOR ADMISSION
Allyson Bar  is a 66 year old   female who  presents today for routine gynecologic exam.  She reports just dx with rectal carcinoma- found due to guiac pos stool. bx confirmed. Pt has not s/s of hx. Remote hx of hpv txe with cryo but no abnormal pap's recently. Last yr nl too. No gu or gyn complaints. .    Patient has an upcoming appointment with Dr. Ivey and is not sure if she should keep it. Patient was also advised maybe to seek a second opinion of an oncologist.    OB/GYN HISTORY:   OB History    Para Term  AB Living   3 2 2 0 0 2   SAB TAB Ectopic Molar Multiple Live Births   0 0 0 0 0 0    No LMP recorded. Patient is postmenopausal.     PAST MEDICAL HISTORY:    Past Medical History:   Diagnosis Date   • Abnormal mammogram of both breasts 2015   • Benign essential hypertension 2017   • Carotid artery occlusion    • Cataract of left eye 2017   • Chondromalacia, right knee 2018   • Colon polyp    • Complex tear of medial meniscus of right knee as current injury 2018   • DM (diabetes mellitus) (CMS/[facility])    • Encounter for medication monitoring 2017   • Gastroesophageal reflux disease    • Glaucoma 2016   • Hemorrhoids 2019    per Colonoscopy   • Hiatal hernia 2019    per EGD   • Hyperlipidemia 2016   • Hyperlipidemia LDL goal <70 2017   • Meniscus, medial, derangement, right 2018   • Obesity    • Onychomycosis 2016   • Preop general physical exam 2017   • Primary open angle glaucoma (POAG) of left eye, severe stage 2018   • Primary open angle glaucoma (POAG) of right eye, mild stage 2018   • Primary open angle glaucoma of left eye, severe stage 2017   • Rectal cancer (CMS/HCC) 2019    squamous carcinoma in situ w/focal features suspicious for invasive squamous cell carcinoma   • Refraction error 2016   • Type 2 diabetes mellitus with hypoglycemia without coma, without long-term  current use of insulin (CMS/[facility]) 2/2/2017       ALLERGIES:  Allergies as of 08/30/2019 - Reviewed 08/30/2019   Allergen Reaction Noted   • Opioid analgesics HIVES 11/02/2016   • Food HIVES 11/02/2016       MEDICATIONS:   Current Outpatient Medications   Medication Sig Dispense Refill   • omeprazole (PRILOSEC) 20 MG capsule Take 1 capsule by mouth daily. 1 tablet 30-60 min daily x 8 weeks. Then 1 tablet every other day x 4 weeks.Take 30-60 min before breakfast. 30 capsule 3   • dorzolamide-timolol (COSOPT) 22.3-6.8 MG/ML ophthalmic solution Place 1 drop into right eye 2 times daily. 15 mL 3   • aspirin 81 MG tablet Take 1 tablet by mouth daily. 100 tablet 0   • glipiZIDE (GLIPIZIDE XL) 10 MG 24 hr tablet Take 1 tablet by mouth daily. 90 tablet 1   • atorvastatin (LIPITOR) 20 MG tablet Take 1 tablet by mouth daily. For cholesterol 90 tablet 1   • metFORMIN (GLUCOPHAGE) 500 MG tablet Take 1 tablet by mouth 3 times daily. (Patient taking differently: Take 500 mg by mouth 2 times daily (with meals). Takes 2 tablets in AM and 1 tablet in PM) 270 tablet 1   • ramipril (ALTACE) 5 MG capsule Take 1 capsule by mouth daily. 90 capsule 1   • blood glucose (ONE TOUCH ULTRA TEST) test strip Test blood sugar 1 times daily as directed. Diagnosis: DM. Meter: One touch 100 strip 3   • travoprost, benzalkonium free, (TRAVATAN Z) 0.004 % ophthalmic solution Place 1 drop into right eye nightly. 3 Bottle 3   • nitrofurantoin (MACRODANTIN) 100 MG capsule Take 1 capsule by mouth as needed (prevention). 30 capsule 1   • DISPENSE Calcium 2 tabs daily (Patient taking differently: 2 times daily. Calcium 2 tabs daily)     • Multiple Vitamins-Minerals (MULTIVITAMIN WOMEN) Tab Take 1 tablet by mouth daily.     • Misc Natural Products (GLUCOSAMINE CHONDROITIN COMPLX) Tab Take 3 tablets by mouth daily. (Patient taking differently: Take 2 tablets by mouth 2 times daily. )     • polyethylene glycol-electrolytes (NULYTELY WITH FLAVOR PACKS) 420 g  solution Take as directed on colonoscopy instruction letter 4000 mL 0     No current facility-administered medications for this visit.        PAST SURGICAL HISTORY:   Past Surgical History:   Procedure Laterality Date   • Arthroscopy knee medial or lat Right 2018    RMarlene;medial   •  delivery only      x2   • Colonoscopy w biopsy  2015    hyperplastic polyps,done in [place] (scanned into EPIC--media tab--4/10/2017))   • Colonoscopy w biopsy  2019    Rectal mass>squamous carcinoma in situ w/focal features suspicious for invasive squamous cell carcinoma,adenoma,hyperplastic,hemorrhoids,.    • Cryocautery of cervix      remote   • Dexa bone density axial skeleton  2016   • Esophagogastroduodenoscopy transoral flex w/bx single or mult  2019    HH,Esophagitis,.        SOCIAL HISTORY:   Social History     Socioeconomic History   • Marital status: /Civil Union     Spouse name: Aman   • Number of children: 2   • Years of education: Not on file   • Highest education level: Not on file   Occupational History   • Occupation: retired   Social Needs   • Financial resource strain: Not on file   • Food insecurity:     Worry: Not on file     Inability: Not on file   • Transportation needs:     Medical: Not on file     Non-medical: Not on file   Tobacco Use   • Smoking status: Never Smoker   • Smokeless tobacco: Never Used   • Tobacco comment: 19   Substance and Sexual Activity   • Alcohol use: Yes     Alcohol/week: 7.0 standard drinks     Types: 7 Glasses of wine per week     Comment: 19   • Drug use: No     Comment: 19   • Sexual activity: Yes     Partners: Male   Lifestyle   • Physical activity:     Days per week: Not on file     Minutes per session: Not on file   • Stress: Not on file   Relationships   • Social connections:     Talks on phone: Not on file     Gets together: Not on file     Attends [employer] service: Not on file      Active member of club or organization: Not on file     Attends meetings of clubs or organizations: Not on file     Relationship status: Not on file   • Intimate partner violence:     Fear of current or ex partner: Not on file     Emotionally abused: Not on file     Physically abused: Not on file     Forced sexual activity: Not on file   Other Topics Concern   • Not on file   Social History Narrative    Retired  from [place]      retired professor       FAMILY HISTORY:    Family History   Problem Relation Age of Onset   • Cancer, Breast Sister    • Diabetes Sister    • Heart Mother    • Heart Father    • Diabetes Brother        Last Labs include:  CHOLESTEROL   Date Value Ref Range Status   06/12/2019 160 <200 mg/dL Final     Comment:        Desirable            <200  Borderline High      200 to 239  High                 >=240            OBJECTIVE:  Blood pressure 126/66, pulse 76, height 5' 3.25\" (1.607 m), weight 77.7 kg.  NECK: No thyromegaly. No cervical adenopathy  LUNGS: Clear to auscultation.  HEART: Regular rate and rhythm.  BREASTS: Symmetric bilaterally without masses or nipple discharge.  No axillary or supraclavicular adenopathy. BSE reviewed and encouraged  ABDOMEN: Soft and nontender without masses or hepatomegaly.  EXTREMITIES: Nontender.  No edema.  PELVIC EXAM: External genitalia: No lesions or masses notedNormal urethral meatus  Vagina:Normal mucosa and no exudate present.  Cervix:Normal, without  lesions or masses  and pap smear obtained.  Uterus:  Six week size, mobile , non-tender, anteverted.  Adnexa:No masses , no tenderness, sidewalls smooth and ligaments smooth.  RECTOVAGINAL: Superficial mass noted approximately 1-1/2 cm up from the anus approximate 2 cm in length half a centimeter in width feels to be superficial and coming off the rectal mucosa and is mobile and not adherent to tissues around or near it. It is along the recto vaginal septum. As described by GI likely between the  2 sphincters. Blood slightly when touched.    IMPRESSION:  > Normal GYN exam  > Rectal mass  PLAN:  > Pap smear and pelvic done today. BSE taught and encouraged  > Patient I discussed that many of these cancers are related to human papilloma virus and that we will continue to monitor or for this issue. Because it is so close to the vagina it could affect sexual function although her 's been treated for prostate cancer in the are not at this point sexually active. We talked about the treatment with surgery radiation or chemotherapy. I do recommend that she gets evaluated by Dr. Ivey for consideration of removing the mass patient was made aware that because this area is so difficult to heal as it performs its normal functional evacuation that at times they will need to do diverting colostomy is to allow the area to heal. Patient may also want consultation with oncology for radiation oncology when she sees Dr. Ivey. Patient's to call me she has any further concerns or issues that come up vaginally or with her bladder happy to help coordinate some of these issues since she is new to the areas well.  > Mammogram: yearly after 51.  orders sent down.  > Return to clinic 1 year or as needed  >       gastrectomy

## 2019-10-19 NOTE — REASON FOR VISIT
[Gastric Cancer] : gastric cancer [Follow-Up Visit] : a follow-up visit for [[employer] ] : provided by [employer]   [FreeTextEntry1] : [id number] [FreeTextEntry2] : Henrry

## 2019-10-19 NOTE — ASSESSMENT
[FreeTextEntry1] : 58 year old male presents for a follow up visit. Initially consulted 1/8/19 for newly diagnosed gastric cancer, referred by Dr. Nguyen.  The patient was found to have iron deficiency anemia and was experiencing right abdominal pain and was referred for an endoscopy on 12/21/18. On endoscopy he was found to have a large antral mass, about 4 cm, with central umbilication, found in the antrum, next to the incisura.  Pathology of the stomach antrum mass showed invasive moderately differentiated adenocarcinoma, negative for H. Pylori.  Subsequently, he underwent a CT of the abdomen and pelvis on 12/28/18 which showed a mass in the gastric body near the antrum measuring 3.8 x 1.47 cm in size, mildly enlarged gastrohepatic ligament lymph nodes, enlarged spleen measuring 15.2 cm, numerous bilateral nonobstructing renal stones, bilateral renal stones, cholelithiasis. \par \par S/p EUS with Dr. Macdonald - Found to have a 36 mm x 26 mm hypoechoic heterogenous lesion in the gastric antrum extending into the serosa. A 1 cm lymph node was seen along the gastrohepatic ligament. This was staged T3N1 by EUS Criteria.    He is s/p 4 cycles of FLOT with Dr. Barrera. \par \par 4/24/19 - s/p robotic assisted laparoscopic distal gastrectomy with Billroth II reconstruction, regional lymphadenectomy and esophagogastroduodenoscopy. \par \par Final pathology:\par 1) Common hepatic artery lymph node excision: 1/4 lymph nodes positive for metastatic adenocarcinoma\par 2) Right gastroepiploic lymph node: 0/1\par 3) Distal gastrectomy:  2.0 cm invasive moderately differentiated adenocarcinoma, carcinoma invades the muscularis propria, negative margins, 1/23 lymph nodes, Tumor stage: pT2 N1\par 4) Celiac and proximal splenic lymph nodes: 0/10\par \par PLAN:\par 1) RTO in 3 months\par 2) Medical oncology follow-up

## 2019-10-19 NOTE — HISTORY OF PRESENT ILLNESS
[de-identified] : 58 year old male presents for an initial post op visit. \par Initially consulted 1/8/19 for newly diagnosed gastric cancer, referred by Dr. Nguyen. \par \par The patient was found to have iron deficiency anemia and was experiencing right abdominal pain and was referred for an endoscopy on 12/21/18. On endoscopy he was found to have a large antral mass, about 4 cm, with central umbilication, found in the antrum, next to the incisura.  Pathology of the stomach antrum mass showed invasive moderately differentiated adenocarcinoma, negative for H. Pylori. \par \par Subsequently, he underwent a CT of the abdomen and pelvis on 12/28/18 which showed a mass in the gastric body near the antrum measuring 3.8 x 1.47 cm in size, mildly enlarged gastrohepatic ligament lymph nodes, enlarged spleen measuring 15.2 cm, numerous bilateral nonobstructing renal stones, bilateral renal stones, cholelithiasis. \par \par PMH notable for kidney stones s/p kidney stone extraction, right inguinal hernia repair, arthritis. Never a smoker, denies alcohol use.  Denies family history of malignancies. \par \par Today he is with c/o occasional right upper quadrant abdominal pain which is worse after eating x 1 week.  He denies nausea, vomiting or bowel habit changes. He states he was started on iron supplements and since then his stools have been very dark and he has been constipated. Denies BRBPR, passing flatus. \par \par INTERVAL HISTORY:\par 1/29/19 -S/p EUS with Dr. Macdonald - Found to have a 36 mm x 26 mm hypoechoic heterogenous lesion in the gastric antrum extending into the serosa. A 1 cm lymph node was seen along the gastrohepatic ligament. This was staged T3N1 by EUS Criteria. \par \par 2/5/19 - The patient is scheduled for a Mediport placement tomorrow. He will begin chemotherapy 5 days after Mediport placement under the care of Dr. Barrera.  Today he is with c/o mild, occasional mid abdominal discomfort. No other changes. \par \par 4/9/19 - Finished 4 cycles of FLOT with Dr. Barrera.\par \par 4/24/19 - s/p robotic assisted laparoscopic distal gastrectomy with Billroth II reconstruction, regional lymphadenectomy and esophagogastroduodenoscopy. \par \par Final pathology:\par 1) Common hepatic artery lymph node excision: 1/4 lymph nodes positive for metastatic adenocarcinoma\par 2) Right gastroepiploic lymph node: 0/1\par 3) Distal gastrectomy:  2.0 cm invasive moderately differentiated adenocarcinoma, carcinoma invades the muscularis propria, negative margins, 1/23 lymph nodes, Tumor stage: pT2 N1\par 4) Celiac and proximal splenic lymph nodes: 0/10\par \par 5/14/19 - No new issues.  Recovering well.

## 2019-11-12 ENCOUNTER — APPOINTMENT (OUTPATIENT)
Dept: SURGICAL ONCOLOGY | Facility: CLINIC | Age: 59
End: 2019-11-12
Payer: COMMERCIAL

## 2019-11-12 VITALS
WEIGHT: 118 LBS | RESPIRATION RATE: 15 BRPM | OXYGEN SATURATION: 100 % | DIASTOLIC BLOOD PRESSURE: 79 MMHG | SYSTOLIC BLOOD PRESSURE: 129 MMHG | HEIGHT: 62 IN | BODY MASS INDEX: 21.71 KG/M2 | HEART RATE: 84 BPM

## 2019-11-12 PROCEDURE — 99214 OFFICE O/P EST MOD 30 MIN: CPT

## 2019-11-13 NOTE — CONSULT LETTER
[Dear  ___] : Dear  [unfilled], [Consult Letter:] : I had the pleasure of evaluating your patient, [unfilled]. [Consult Closing:] : Thank you very much for allowing me to participate in the care of this patient.  If you have any questions, please do not hesitate to contact me. [Please see my note below.] : Please see my note below. [Sincerely,] : Sincerely, [FreeTextEntry3] : Trey Peter MD, MPH, FACS\par Surgical Oncology\par Assistant Professor of Surgery\par [facility] [employer] [facility]  [DrArleen  ___] : Dr. GAMEZ

## 2019-11-13 NOTE — ASSESSMENT
[FreeTextEntry1] : 58 year old male presents for a follow up visit. Initially consulted 1/8/19 for newly diagnosed gastric cancer, referred by Dr. Nguyen.  The patient was found to have iron deficiency anemia and was experiencing right abdominal pain and was referred for an endoscopy on 12/21/18. On endoscopy he was found to have a large antral mass, about 4 cm, with central umbilication, found in the antrum, next to the incisura.  Pathology of the stomach antrum mass showed invasive moderately differentiated adenocarcinoma, negative for H. Pylori.  Subsequently, he underwent a CT of the abdomen and pelvis on 12/28/18 which showed a mass in the gastric body near the antrum measuring 3.8 x 1.47 cm in size, mildly enlarged gastrohepatic ligament lymph nodes, enlarged spleen measuring 15.2 cm, numerous bilateral nonobstructing renal stones, bilateral renal stones, cholelithiasis. \par \par S/p EUS with Dr. Macdonald - Found to have a 36 mm x 26 mm hypoechoic heterogenous lesion in the gastric antrum extending into the serosa. A 1 cm lymph node was seen along the gastrohepatic ligament. This was staged T3N1 by EUS Criteria.    He is s/p 4 cycles of FLOT with Dr. Barrera. \par \par 4/24/19 - s/p robotic assisted laparoscopic distal gastrectomy with Billroth II reconstruction, regional lymphadenectomy and esophagogastroduodenoscopy. \par \par Final pathology:\par 1) Common hepatic artery lymph node excision: 1/4 lymph nodes positive for metastatic adenocarcinoma\par 2) Right gastroepiploic lymph node: 0/1\par 3) Distal gastrectomy:  2.0 cm invasive moderately differentiated adenocarcinoma, carcinoma invades the muscularis propria, negative margins, 1/23 lymph nodes, Tumor stage: pT2 N1\par 4) Celiac and proximal splenic lymph nodes: 0/10\par \par 11/12/19 - He states he completed adjuvant chemotherapy 2 months ago under the care of Dr. Bernardo Ni.  No recent imaging.  Today he is with c/o occasional right abdominal pain, 2/10 on pain scale.  He is able to tolerate small meals.  He experiences occasional vomiting with bigger and heavier meals.  Having regular BM's and passing flatus.  Denies fever or chills.  Feeling well overall. States he is scheduled for an endoscopy in December 2019.  \par \par PLAN:\par 1) Continue increase PO intake / protein\par 2) Continue f/u with Dr. Ni (Heme-onc)\par 3) CT A/P now and then in 1 year \par 4) Endoscopy 12/2019\par 5) RTO in 6 months

## 2019-11-13 NOTE — REASON FOR VISIT
[Follow-Up Visit] : a follow-up visit for [Gastric Cancer] : gastric cancer [[employer] ] : provided by [employer]   [FreeTextEntry2] : 4/24/19 - s/p robotic assisted laparoscopic distal gastrectomy with Billroth II reconstruction, regional lymphadenectomy and esophagogastroduodenoscopy.  [FreeTextEntry1] : [id number] [TWNoteComboBox1] : [place]

## 2019-11-13 NOTE — PHYSICAL EXAM
[Normal] : supple, no neck mass and thyroid not enlarged [Normal] : oriented to person, place and time, with appropriate affect [de-identified] : well healed incisions; soft, ND, NT;  no masses or hernias

## 2019-11-13 NOTE — HISTORY OF PRESENT ILLNESS
[de-identified] : 58 year old male presents for a 3 month follow up visit. \par Initially consulted 1/8/19 for newly diagnosed gastric cancer, referred by Dr. Nguyen. \par \par The patient was found to have iron deficiency anemia and was experiencing right abdominal pain and was referred for an endoscopy on 12/21/18. On endoscopy he was found to have a large antral mass, about 4 cm, with central umbilication, found in the antrum, next to the incisura.  Pathology of the stomach antrum mass showed invasive moderately differentiated adenocarcinoma, negative for H. Pylori. \par \par Subsequently, he underwent a CT of the abdomen and pelvis on 12/28/18 which showed a mass in the gastric body near the antrum measuring 3.8 x 1.47 cm in size, mildly enlarged gastrohepatic ligament lymph nodes, enlarged spleen measuring 15.2 cm, numerous bilateral nonobstructing renal stones, bilateral renal stones, cholelithiasis. \par \par PMH notable for kidney stones s/p kidney stone extraction, right inguinal hernia repair, arthritis. Never a smoker, denies alcohol use.  Denies family history of malignancies. \par \par Today he is with c/o occasional right upper quadrant abdominal pain which is worse after eating x 1 week.  He denies nausea, vomiting or bowel habit changes. He states he was started on iron supplements and since then his stools have been very dark and he has been constipated. Denies BRBPR, passing flatus. \par \par INTERVAL HISTORY:\par 1/29/19 -S/p EUS with Dr. Macdonald - Found to have a 36 mm x 26 mm hypoechoic heterogenous lesion in the gastric antrum extending into the serosa. A 1 cm lymph node was seen along the gastrohepatic ligament. This was staged T3N1 by EUS Criteria. \par \par 2/5/19 - The patient is scheduled for a Mediport placement tomorrow. He will begin chemotherapy 5 days after Mediport placement under the care of Dr. Barrera.  Today he is with c/o mild, occasional mid abdominal discomfort. No other changes. \par \par 4/9/19 - Finished 4 cycles of FLOT with Dr. Barrera.\par \par 4/24/19 - s/p robotic assisted laparoscopic distal gastrectomy with Billroth II reconstruction, regional lymphadenectomy and esophagogastroduodenoscopy. \par \par Final pathology:\par 1) Common hepatic artery lymph node excision: 1/4 lymph nodes positive for metastatic adenocarcinoma\par 2) Right gastroepiploic lymph node: 0/1\par 3) Distal gastrectomy:  2.0 cm invasive moderately differentiated adenocarcinoma, carcinoma invades the muscularis propria, negative margins, 1/23 lymph nodes, Tumor stage: pT2 N1\par 4) Celiac and proximal splenic lymph nodes: 0/10\par \par 5/14/19 - Feeling well with no complaints. \par \par 8/13/19 - \par \par 11/12/19 - He states he completed adjuvant chemotherapy 2 months ago under the care of Dr. Bernardo Ni.  No recent imaging.  Today he is with c/o occasional right abdominal pain, 2/10 on pain scale.  He is able to tolerate small meals.  He experiences occasional vomiting with bigger and heavier meals.  Having regular BM's and passing flatus.  Denies fever or chills.  Feeling well overall. States he is scheduled for an endoscopy in December 2019.

## 2019-11-15 ENCOUNTER — FORM ENCOUNTER (OUTPATIENT)
Age: 59
End: 2019-11-15

## 2019-11-16 ENCOUNTER — APPOINTMENT (OUTPATIENT)
Dept: CT IMAGING | Facility: CLINIC | Age: 59
End: 2019-11-16
Payer: COMMERCIAL

## 2019-11-16 ENCOUNTER — OUTPATIENT (OUTPATIENT)
Dept: OUTPATIENT SERVICES | Facility: HOSPITAL | Age: 59
LOS: 1 days | End: 2019-11-16
Payer: COMMERCIAL

## 2019-11-16 DIAGNOSIS — Z98.890 OTHER SPECIFIED POSTPROCEDURAL STATES: Chronic | ICD-10-CM

## 2019-11-16 DIAGNOSIS — N20.0 CALCULUS OF KIDNEY: Chronic | ICD-10-CM

## 2019-11-16 DIAGNOSIS — C16.9 MALIGNANT NEOPLASM OF STOMACH, UNSPECIFIED: Chronic | ICD-10-CM

## 2019-11-16 DIAGNOSIS — C16.9 MALIGNANT NEOPLASM OF STOMACH, UNSPECIFIED: ICD-10-CM

## 2019-11-16 PROCEDURE — 74177 CT ABD & PELVIS W/CONTRAST: CPT

## 2019-11-16 PROCEDURE — 74177 CT ABD & PELVIS W/CONTRAST: CPT | Mod: 26

## 2019-12-31 ENCOUNTER — APPOINTMENT (OUTPATIENT)
Dept: NEUROLOGY | Facility: CLINIC | Age: 59
End: 2019-12-31

## 2020-07-28 ENCOUNTER — APPOINTMENT (OUTPATIENT)
Dept: SURGICAL ONCOLOGY | Facility: CLINIC | Age: 60
End: 2020-07-28
Payer: COMMERCIAL

## 2020-07-28 VITALS
OXYGEN SATURATION: 99 % | HEART RATE: 90 BPM | SYSTOLIC BLOOD PRESSURE: 124 MMHG | DIASTOLIC BLOOD PRESSURE: 78 MMHG | TEMPERATURE: 98.1 F | RESPIRATION RATE: 16 BRPM

## 2020-07-28 PROCEDURE — 99214 OFFICE O/P EST MOD 30 MIN: CPT

## 2020-10-13 NOTE — HISTORY OF PRESENT ILLNESS
[de-identified] : 59 year old male presents for a 6 month follow up visit. \par Initially consulted 1/8/19 for newly diagnosed gastric cancer, referred by Dr. Nguyen. \par \par The patient was found to have iron deficiency anemia and was experiencing right abdominal pain and was referred for an endoscopy on 12/21/18. On endoscopy he was found to have a large antral mass, about 4 cm, with central umbilication, found in the antrum, next to the incisura.  Pathology of the stomach antrum mass showed invasive moderately differentiated adenocarcinoma, negative for H. Pylori. \par \par Subsequently, he underwent a CT of the abdomen and pelvis on 12/28/18 which showed a mass in the gastric body near the antrum measuring 3.8 x 1.47 cm in size, mildly enlarged gastrohepatic ligament lymph nodes, enlarged spleen measuring 15.2 cm, numerous bilateral nonobstructing renal stones, bilateral renal stones, cholelithiasis. \par \par PMH notable for kidney stones s/p kidney stone extraction, right inguinal hernia repair, arthritis. Never a smoker, denies alcohol use.  Denies family history of malignancies. \par \par Today he is with c/o occasional right upper quadrant abdominal pain which is worse after eating x 1 week.  He denies nausea, vomiting or bowel habit changes. He states he was started on iron supplements and since then his stools have been very dark and he has been constipated. Denies BRBPR, passing flatus. \par \par INTERVAL HISTORY:\par 1/29/19 -S/p EUS with Dr. Macdonald - Found to have a 36 mm x 26 mm hypoechoic heterogenous lesion in the gastric antrum extending into the serosa. A 1 cm lymph node was seen along the gastrohepatic ligament. This was staged T3N1 by EUS Criteria. \par \par 2/5/19 - The patient is scheduled for a Mediport placement tomorrow. He will begin chemotherapy 5 days after Mediport placement under the care of Dr. Barrera.  Today he is with c/o mild, occasional mid abdominal discomfort. No other changes. \par \par 4/9/19 - Finished 4 cycles of FLOT with Dr. Barrera.\par \par *******SURGERY********\par 4/24/19 - s/p robotic assisted laparoscopic distal gastrectomy with Billroth II reconstruction, regional lymphadenectomy and esophagogastroduodenoscopy. \par \par Final pathology:\par 1) Common hepatic artery lymph node excision: 1/4 lymph nodes positive for metastatic adenocarcinoma\par 2) Right gastroepiploic lymph node: 0/1\par 3) Distal gastrectomy:  2.0 cm invasive moderately differentiated adenocarcinoma, carcinoma invades the muscularis propria, negative margins, 1/23 lymph nodes, Tumor stage: pT2 N1\par 4) Celiac and proximal splenic lymph nodes: 0/10\par \par 5/14/19 - Feeling well with no complaints. \par \par 11/12/19 - He states he completed adjuvant chemotherapy 2 months ago under the care of Dr. Bernardo Ni.  No recent imaging.  Today he is with c/o occasional right abdominal pain, 2/10 on pain scale.  He is able to tolerate small meals.  He experiences occasional vomiting with bigger and heavier meals.  Having regular BM's and passing flatus.  Denies fever or chills.  Feeling well overall. States he is scheduled for an endoscopy in December 2019.  \par \par CT A/P from 11/18/19 which showed no signs of recurrent disease.\par \par 7/28/2020 - Doing well.  No issues.

## 2020-10-13 NOTE — ASSESSMENT
[FreeTextEntry1] : 58 year old male presents for a follow up visit. Initially consulted 1/8/19 for newly diagnosed gastric cancer, referred by Dr. Nguyen.  The patient was found to have iron deficiency anemia and was experiencing right abdominal pain and was referred for an endoscopy on 12/21/18. On endoscopy he was found to have a large antral mass, about 4 cm, with central umbilication, found in the antrum, next to the incisura.  Pathology of the stomach antrum mass showed invasive moderately differentiated adenocarcinoma, negative for H. Pylori.  Subsequently, he underwent a CT of the abdomen and pelvis on 12/28/18 which showed a mass in the gastric body near the antrum measuring 3.8 x 1.47 cm in size, mildly enlarged gastrohepatic ligament lymph nodes, enlarged spleen measuring 15.2 cm, numerous bilateral nonobstructing renal stones, bilateral renal stones, cholelithiasis. \par \par S/p EUS with Dr. Macdonald - Found to have a 36 mm x 26 mm hypoechoic heterogenous lesion in the gastric antrum extending into the serosa. A 1 cm lymph node was seen along the gastrohepatic ligament. This was staged T3N1 by EUS Criteria.    He is s/p 4 cycles of FLOT with Dr. Barrera. \par \par 4/24/19 - s/p robotic assisted laparoscopic distal gastrectomy with Billroth II reconstruction, regional lymphadenectomy and esophagogastroduodenoscopy. \par \par Final pathology:\par 1) Common hepatic artery lymph node excision: 1/4 lymph nodes positive for metastatic adenocarcinoma\par 2) Right gastroepiploic lymph node: 0/1\par 3) Distal gastrectomy:  2.0 cm invasive moderately differentiated adenocarcinoma, carcinoma invades the muscularis propria, negative margins, 1/23 lymph nodes, Tumor stage: pT2 N1\par 4) Celiac and proximal splenic lymph nodes: 0/10\par \par Underwent a CT A/P from 11/18/19 which showed no signs of recurrent disease.\par 7/28/2020 - \par \par PLAN:\par 1) CT A/P Nov 2020  \par 2) Continue f/u with Dr. Ni (Heme-onc)\par 3) Endoscopy 12/2019\par 4) RTO in 6 months\par

## 2021-03-02 ENCOUNTER — APPOINTMENT (OUTPATIENT)
Dept: SURGICAL ONCOLOGY | Facility: CLINIC | Age: 61
End: 2021-03-02
Payer: COMMERCIAL

## 2021-03-02 VITALS
TEMPERATURE: 97.6 F | BODY MASS INDEX: 22.08 KG/M2 | OXYGEN SATURATION: 100 % | SYSTOLIC BLOOD PRESSURE: 129 MMHG | HEIGHT: 62 IN | DIASTOLIC BLOOD PRESSURE: 79 MMHG | RESPIRATION RATE: 16 BRPM | WEIGHT: 120 LBS | HEART RATE: 75 BPM

## 2021-03-02 PROCEDURE — 99072 ADDL SUPL MATRL&STAF TM PHE: CPT

## 2021-03-02 PROCEDURE — 99214 OFFICE O/P EST MOD 30 MIN: CPT

## 2021-03-02 NOTE — CONSULT LETTER
[Dear  ___] : Dear  [unfilled], [Consult Letter:] : I had the pleasure of evaluating your patient, [unfilled]. [Please see my note below.] : Please see my note below. [Consult Closing:] : Thank you very much for allowing me to participate in the care of this patient.  If you have any questions, please do not hesitate to contact me. [Sincerely,] : Sincerely, [FreeTextEntry3] : Trey Peter MD, MPH, FACS\par Surgical Oncology\par Assistant Professor of Surgery\par [employer] [employer] [facility] [employer] at [facility]  [DrArleen  ___] : Dr. GAMEZ

## 2021-03-02 NOTE — PHYSICAL EXAM
[Normal] : supple, no neck mass and thyroid not enlarged [Normal Neck Lymph Nodes] : normal neck lymph nodes  [Normal Supraclavicular Lymph Nodes] : normal supraclavicular lymph nodes [Normal] : oriented to person, place and time, with appropriate affect [de-identified] : Incisions healed.  Small reducible para umbilical hernia.  No mass

## 2021-03-02 NOTE — ASSESSMENT
[FreeTextEntry1] : 58 year old male presents for a follow up visit. Initially consulted 1/8/19 for newly diagnosed gastric cancer, referred by Dr. Nguyen.  The patient was found to have iron deficiency anemia and was experiencing right abdominal pain and was referred for an endoscopy on 12/21/18. On endoscopy he was found to have a large antral mass, about 4 cm, with central umbilication, found in the antrum, next to the incisura.  Pathology of the stomach antrum mass showed invasive moderately differentiated adenocarcinoma, negative for H. Pylori.  Subsequently, he underwent a CT of the abdomen and pelvis on 12/28/18 which showed a mass in the gastric body near the antrum measuring 3.8 x 1.47 cm in size, mildly enlarged gastrohepatic ligament lymph nodes, enlarged spleen measuring 15.2 cm, numerous bilateral nonobstructing renal stones, bilateral renal stones, cholelithiasis. \par \par S/p EUS with Dr. Macdonald - Found to have a 36 mm x 26 mm hypoechoic heterogenous lesion in the gastric antrum extending into the serosa. A 1 cm lymph node was seen along the gastrohepatic ligament. This was staged T3N1 by EUS Criteria.    He is s/p 4 cycles of FLOT with Dr. Barrera. \par \par 4/24/19 - s/p robotic assisted laparoscopic distal gastrectomy with Billroth II reconstruction, regional lymphadenectomy and esophagogastroduodenoscopy. \par \par Final pathology:\par 1) Common hepatic artery lymph node excision: 1/4 lymph nodes positive for metastatic adenocarcinoma\par 2) Right gastroepiploic lymph node: 0/1\par 3) Distal gastrectomy:  2.0 cm invasive moderately differentiated adenocarcinoma, carcinoma invades the muscularis propria, negative margins, 1/23 lymph nodes, Tumor stage: pT2 N1\par 4) Celiac and proximal splenic lymph nodes: 0/10\par \par Patient is clinically and radiologically CASIMIRO.\par \par PLAN:\par 1) CT A/P April 2021\par 2) Continue f/u with Dr. Ni (Heme-onc)\par 3) RTO in 6 months\par 4) Sending for hernia repair evaluation\par

## 2021-03-02 NOTE — HISTORY OF PRESENT ILLNESS
[de-identified] : 60 year old male presents for a 6 month follow up visit. \par Initially consulted 1/8/19 for newly diagnosed gastric cancer, referred by Dr. Nguyen. \par \par The patient was found to have iron deficiency anemia and was experiencing right abdominal pain and was referred for an endoscopy on 12/21/18. On endoscopy he was found to have a large antral mass, about 4 cm, with central umbilication, found in the antrum, next to the incisura.  Pathology of the stomach antrum mass showed invasive moderately differentiated adenocarcinoma, negative for H. Pylori. \par \par Subsequently, he underwent a CT of the abdomen and pelvis on 12/28/18 which showed a mass in the gastric body near the antrum measuring 3.8 x 1.47 cm in size, mildly enlarged gastrohepatic ligament lymph nodes, enlarged spleen measuring 15.2 cm, numerous bilateral nonobstructing renal stones, bilateral renal stones, cholelithiasis. \par \par PMH notable for kidney stones s/p kidney stone extraction, right inguinal hernia repair, arthritis. Never a smoker, denies alcohol use.  Denies family history of malignancies. \par \par Today he is with c/o occasional right upper quadrant abdominal pain which is worse after eating x 1 week.  He denies nausea, vomiting or bowel habit changes. He states he was started on iron supplements and since then his stools have been very dark and he has been constipated. Denies BRBPR, passing flatus. \par \par INTERVAL HISTORY:\par 1/29/19 -S/p EUS with Dr. Macdonald - Found to have a 36 mm x 26 mm hypoechoic heterogenous lesion in the gastric antrum extending into the serosa. A 1 cm lymph node was seen along the gastrohepatic ligament. This was staged T3N1 by EUS Criteria. \par \par 2/5/19 - The patient is scheduled for a Mediport placement tomorrow. He will begin chemotherapy 5 days after Mediport placement under the care of Dr. Barrera.  Today he is with c/o mild, occasional mid abdominal discomfort. No other changes. \par \par 4/9/19 - Finished 4 cycles of FLOT with Dr. Barrera.\par \par *******SURGERY********\par 4/24/19 - s/p robotic assisted laparoscopic distal gastrectomy with Billroth II reconstruction, regional lymphadenectomy and esophagogastroduodenoscopy. \par \par Final pathology:\par 1) Common hepatic artery lymph node excision: 1/4 lymph nodes positive for metastatic adenocarcinoma\par 2) Right gastroepiploic lymph node: 0/1\par 3) Distal gastrectomy:  2.0 cm invasive moderately differentiated adenocarcinoma, carcinoma invades the muscularis propria, negative margins, 1/23 lymph nodes, Tumor stage: pT2 N1\par 4) Celiac and proximal splenic lymph nodes: 0/10\par \par 5/14/19 - Feeling well with no complaints. \par \par 11/12/19 - He states he completed adjuvant chemotherapy 2 months ago under the care of Dr. Bernardo Ni.  No recent imaging.  Today he is with c/o occasional right abdominal pain, 2/10 on pain scale.  He is able to tolerate small meals.  He experiences occasional vomiting with bigger and heavier meals.  Having regular BM's and passing flatus.  Denies fever or chills.  Feeling well overall. States he is scheduled for an endoscopy in December 2019.  \par \par CT A/P from 11/18/19 which showed no signs of recurrent disease.\par \par 7/28/2020 - Doing well.  No issues.\par \par Dr. Ni referred him for a CT abdomen/pelvis in October 2020 which showed no evidence of recurrent or metastatic disease.\par \par 3/2/21- He is scheduled to follow up with Dr. Ni in 2 months.  He has not seen GI and has not scheduled any follow up surveillance endoscopies.  He notes a small bump in his umbilical region that is painful if he lifts something heavy.  He also notes one day history of tenderness in his left upper quadrant.  He weight and appetite are preserved and he denies any nausea/vomiting, fever or chills.

## 2021-03-16 ENCOUNTER — APPOINTMENT (OUTPATIENT)
Dept: RHEUMATOLOGY | Facility: CLINIC | Age: 61
End: 2021-03-16

## 2021-04-01 ENCOUNTER — APPOINTMENT (OUTPATIENT)
Dept: CT IMAGING | Facility: CLINIC | Age: 61
End: 2021-04-01

## 2021-04-22 ENCOUNTER — APPOINTMENT (OUTPATIENT)
Dept: RHEUMATOLOGY | Facility: CLINIC | Age: 61
End: 2021-04-22
Payer: COMMERCIAL

## 2021-04-22 VITALS
HEIGHT: 62 IN | BODY MASS INDEX: 21.35 KG/M2 | OXYGEN SATURATION: 98 % | SYSTOLIC BLOOD PRESSURE: 136 MMHG | TEMPERATURE: 97.5 F | WEIGHT: 116 LBS | DIASTOLIC BLOOD PRESSURE: 77 MMHG | HEART RATE: 74 BPM

## 2021-04-22 PROCEDURE — 99204 OFFICE O/P NEW MOD 45 MIN: CPT

## 2021-04-22 PROCEDURE — 99072 ADDL SUPL MATRL&STAF TM PHE: CPT

## 2021-05-02 ENCOUNTER — NON-APPOINTMENT (OUTPATIENT)
Age: 61
End: 2021-05-02

## 2021-05-02 LAB
25(OH)D3 SERPL-MCNC: 32 NG/ML
ALBUMIN MFR SERPL ELPH: 61 %
ALBUMIN SERPL ELPH-MCNC: 4.5 G/DL
ALBUMIN SERPL-MCNC: 4.2 G/DL
ALBUMIN/GLOB SERPL: 1.6 RATIO
ALP BLD-CCNC: 132 U/L
ALPHA1 GLOB MFR SERPL ELPH: 4.8 %
ALPHA1 GLOB SERPL ELPH-MCNC: 0.3 G/DL
ALPHA2 GLOB MFR SERPL ELPH: 7.9 %
ALPHA2 GLOB SERPL ELPH-MCNC: 0.5 G/DL
ALT SERPL-CCNC: 13 U/L
ANION GAP SERPL CALC-SCNC: 12 MMOL/L
AST SERPL-CCNC: 19 U/L
B BURGDOR AB SER-IMP: NEGATIVE
B BURGDOR IGM PATRN SER IB-IMP: NEGATIVE
B BURGDOR18KD IGG SER QL IB: NORMAL
B BURGDOR23KD IGG SER QL IB: NORMAL
B BURGDOR23KD IGM SER QL IB: NORMAL
B BURGDOR28KD IGG SER QL IB: NORMAL
B BURGDOR30KD IGG SER QL IB: NORMAL
B BURGDOR31KD IGG SER QL IB: NORMAL
B BURGDOR39KD IGG SER QL IB: NORMAL
B BURGDOR39KD IGM SER QL IB: NORMAL
B BURGDOR41KD IGG SER QL IB: PRESENT
B BURGDOR41KD IGM SER QL IB: PRESENT
B BURGDOR45KD IGG SER QL IB: NORMAL
B BURGDOR58KD IGG SER QL IB: NORMAL
B BURGDOR66KD IGG SER QL IB: NORMAL
B BURGDOR93KD IGG SER QL IB: NORMAL
B-GLOBULIN MFR SERPL ELPH: 12.4 %
B-GLOBULIN SERPL ELPH-MCNC: 0.9 G/DL
BASOPHILS # BLD AUTO: 0.02 K/UL
BASOPHILS NFR BLD AUTO: 0.5 %
BILIRUB SERPL-MCNC: 0.7 MG/DL
BUN SERPL-MCNC: 11 MG/DL
CALCIUM SERPL-MCNC: 9.2 MG/DL
CCP AB SER IA-ACNC: >250 UNITS
CD16+CD56+ CELLS # BLD: 72 /UL
CD16+CD56+ CELLS NFR BLD: 8 %
CD19 CELLS NFR BLD: 33 /UL
CD3 CELLS # BLD: 793 /UL
CD3 CELLS NFR BLD: 87 %
CD3+CD4+ CELLS # BLD: 409 /UL
CD3+CD4+ CELLS NFR BLD: 44 %
CD3+CD4+ CELLS/CD3+CD8+ CLL SPEC: 2.25 RATIO
CD3+CD8+ CELLS # SPEC: 182 /UL
CD3+CD8+ CELLS NFR BLD: 19 %
CELLS.CD3-CD19+/CELLS IN BLOOD: 4 %
CHLORIDE SERPL-SCNC: 105 MMOL/L
CO2 SERPL-SCNC: 22 MMOL/L
CREAT SERPL-MCNC: 0.74 MG/DL
CRP SERPL-MCNC: 14 MG/L
DEPRECATED KAPPA LC FREE/LAMBDA SER: 0.98 RATIO
EOSINOPHIL # BLD AUTO: 0.06 K/UL
EOSINOPHIL NFR BLD AUTO: 1.6 %
ERYTHROCYTE [SEDIMENTATION RATE] IN BLOOD BY WESTERGREN METHOD: 23 MM/HR
G6PD SER-CCNC: 20.4 U/G HGB
GAMMA GLOB FLD ELPH-MCNC: 1 G/DL
GAMMA GLOB MFR SERPL ELPH: 13.9 %
GLUCOSE SERPL-MCNC: 92 MG/DL
HBV CORE IGG+IGM SER QL: NONREACTIVE
HBV SURFACE AB SER QL: NONREACTIVE
HBV SURFACE AG SER QL: NONREACTIVE
HCT VFR BLD CALC: 38.3 %
HCV AB SER QL: NONREACTIVE
HCV S/CO RATIO: 0.12 S/CO
HGB BLD-MCNC: 12.1 G/DL
IGA SER QL IEP: 200 MG/DL
IGG SER QL IEP: 1060 MG/DL
IGM SER QL IEP: 67 MG/DL
IMM GRANULOCYTES NFR BLD AUTO: 0.3 %
INTERPRETATION SERPL IEP-IMP: NORMAL
KAPPA LC CSF-MCNC: 2.03 MG/DL
KAPPA LC SERPL-MCNC: 1.98 MG/DL
LYMPHOCYTES # BLD AUTO: 0.89 K/UL
LYMPHOCYTES NFR BLD AUTO: 23.2 %
M PROTEIN SPEC IFE-MCNC: NORMAL
MAN DIFF?: NORMAL
MCHC RBC-ENTMCNC: 25.6 PG
MCHC RBC-ENTMCNC: 31.6 GM/DL
MCV RBC AUTO: 81.1 FL
MONOCYTES # BLD AUTO: 0.47 K/UL
MONOCYTES NFR BLD AUTO: 12.3 %
NEUTROPHILS # BLD AUTO: 2.38 K/UL
NEUTROPHILS NFR BLD AUTO: 62.1 %
PLATELET # BLD AUTO: 213 K/UL
POTASSIUM SERPL-SCNC: 3.7 MMOL/L
PROT SERPL-MCNC: 6.9 G/DL
RBC # BLD: 4.72 M/UL
RBC # FLD: 15.1 %
RF+CCP IGG SER-IMP: ABNORMAL
RHEUMATOID FACT SER QL: 23 IU/ML
SODIUM SERPL-SCNC: 139 MMOL/L
WBC # FLD AUTO: 3.83 K/UL

## 2021-05-10 ENCOUNTER — NON-APPOINTMENT (OUTPATIENT)
Age: 61
End: 2021-05-10

## 2021-05-15 ENCOUNTER — OUTPATIENT (OUTPATIENT)
Dept: OUTPATIENT SERVICES | Facility: HOSPITAL | Age: 61
LOS: 1 days | End: 2021-05-15
Payer: COMMERCIAL

## 2021-05-15 ENCOUNTER — APPOINTMENT (OUTPATIENT)
Dept: RADIOLOGY | Facility: CLINIC | Age: 61
End: 2021-05-15
Payer: COMMERCIAL

## 2021-05-15 ENCOUNTER — RESULT REVIEW (OUTPATIENT)
Age: 61
End: 2021-05-15

## 2021-05-15 DIAGNOSIS — N20.0 CALCULUS OF KIDNEY: Chronic | ICD-10-CM

## 2021-05-15 DIAGNOSIS — Z98.890 OTHER SPECIFIED POSTPROCEDURAL STATES: Chronic | ICD-10-CM

## 2021-05-15 DIAGNOSIS — Z00.8 ENCOUNTER FOR OTHER GENERAL EXAMINATION: ICD-10-CM

## 2021-05-15 DIAGNOSIS — C16.9 MALIGNANT NEOPLASM OF STOMACH, UNSPECIFIED: Chronic | ICD-10-CM

## 2021-05-15 DIAGNOSIS — L40.50 ARTHROPATHIC PSORIASIS, UNSPECIFIED: ICD-10-CM

## 2021-05-15 PROCEDURE — 72100 X-RAY EXAM L-S SPINE 2/3 VWS: CPT | Mod: 26

## 2021-05-15 PROCEDURE — 73630 X-RAY EXAM OF FOOT: CPT | Mod: 26,50

## 2021-05-15 PROCEDURE — 73120 X-RAY EXAM OF HAND: CPT | Mod: 26,50

## 2021-05-15 PROCEDURE — 72100 X-RAY EXAM L-S SPINE 2/3 VWS: CPT

## 2021-05-15 PROCEDURE — 73630 X-RAY EXAM OF FOOT: CPT

## 2021-05-15 PROCEDURE — 73120 X-RAY EXAM OF HAND: CPT

## 2021-06-02 ENCOUNTER — APPOINTMENT (OUTPATIENT)
Dept: RHEUMATOLOGY | Facility: CLINIC | Age: 61
End: 2021-06-02

## 2021-06-03 ENCOUNTER — APPOINTMENT (OUTPATIENT)
Dept: RHEUMATOLOGY | Facility: CLINIC | Age: 61
End: 2021-06-03
Payer: COMMERCIAL

## 2021-06-03 VITALS
DIASTOLIC BLOOD PRESSURE: 77 MMHG | BODY MASS INDEX: 21.53 KG/M2 | WEIGHT: 117 LBS | HEIGHT: 62 IN | TEMPERATURE: 98.2 F | SYSTOLIC BLOOD PRESSURE: 120 MMHG | OXYGEN SATURATION: 98 % | HEART RATE: 76 BPM

## 2021-06-03 PROCEDURE — 99072 ADDL SUPL MATRL&STAF TM PHE: CPT

## 2021-06-03 PROCEDURE — 99214 OFFICE O/P EST MOD 30 MIN: CPT

## 2021-07-02 ENCOUNTER — APPOINTMENT (OUTPATIENT)
Dept: RHEUMATOLOGY | Facility: CLINIC | Age: 61
End: 2021-07-02

## 2021-08-03 ENCOUNTER — APPOINTMENT (OUTPATIENT)
Dept: RHEUMATOLOGY | Facility: CLINIC | Age: 61
End: 2021-08-03
Payer: COMMERCIAL

## 2021-08-03 VITALS
SYSTOLIC BLOOD PRESSURE: 126 MMHG | HEIGHT: 61 IN | WEIGHT: 123 LBS | BODY MASS INDEX: 23.22 KG/M2 | TEMPERATURE: 98.2 F | OXYGEN SATURATION: 98 % | HEART RATE: 69 BPM | DIASTOLIC BLOOD PRESSURE: 72 MMHG

## 2021-08-03 DIAGNOSIS — L40.50 ARTHROPATHIC PSORIASIS, UNSPECIFIED: ICD-10-CM

## 2021-08-03 PROCEDURE — 99214 OFFICE O/P EST MOD 30 MIN: CPT

## 2021-08-03 RX ORDER — SULFASALAZINE 500 MG/1
500 TABLET ORAL TWICE DAILY
Qty: 60 | Refills: 3 | Status: ACTIVE | COMMUNITY
Start: 2021-06-03 | End: 1900-01-01

## 2021-08-08 PROBLEM — L40.50 PSORIATIC ARTHRITIS: Status: ACTIVE | Noted: 2018-12-04

## 2021-09-07 ENCOUNTER — APPOINTMENT (OUTPATIENT)
Dept: SURGICAL ONCOLOGY | Facility: CLINIC | Age: 61
End: 2021-09-07
Payer: COMMERCIAL

## 2021-09-07 VITALS
OXYGEN SATURATION: 97 % | HEART RATE: 90 BPM | RESPIRATION RATE: 17 BRPM | DIASTOLIC BLOOD PRESSURE: 87 MMHG | BODY MASS INDEX: 22.66 KG/M2 | HEIGHT: 61 IN | WEIGHT: 120 LBS | SYSTOLIC BLOOD PRESSURE: 146 MMHG

## 2021-09-07 DIAGNOSIS — R63.4 ABNORMAL WEIGHT LOSS: ICD-10-CM

## 2021-09-07 PROCEDURE — 99214 OFFICE O/P EST MOD 30 MIN: CPT

## 2021-09-09 PROBLEM — R63.4 WEIGHT LOSS: Status: ACTIVE | Noted: 2021-09-09

## 2021-09-09 NOTE — CONSULT LETTER
[Dear  ___] : Dear  [unfilled], [Consult Letter:] : I had the pleasure of evaluating your patient, [unfilled]. [Please see my note below.] : Please see my note below. [Consult Closing:] : Thank you very much for allowing me to participate in the care of this patient.  If you have any questions, please do not hesitate to contact me. [Sincerely,] : Sincerely, [DrArleen  ___] : Dr. GAMEZ [FreeTextEntry3] : Trey Peter MD, MPH, FACS\par Surgical Oncology\par Assistant Professor of Surgery\par [facility] [employer] at [facility]

## 2021-09-09 NOTE — ASSESSMENT
[FreeTextEntry1] : 60 year old male presents for a follow up visit. Initially consulted 1/8/19 for newly diagnosed gastric cancer, referred by Dr. Nguyen.  The patient was found to have iron deficiency anemia and was experiencing right abdominal pain and was referred for an endoscopy on 12/21/18. On endoscopy he was found to have a large antral mass, about 4 cm, with central umbilication, found in the antrum, next to the incisura.  Pathology of the stomach antrum mass showed invasive moderately differentiated adenocarcinoma, negative for H. Pylori.  Subsequently, he underwent a CT of the abdomen and pelvis on 12/28/18 which showed a mass in the gastric body near the antrum measuring 3.8 x 1.47 cm in size, mildly enlarged gastrohepatic ligament lymph nodes, enlarged spleen measuring 15.2 cm, numerous bilateral nonobstructing renal stones, bilateral renal stones, cholelithiasis. \par \par S/p EUS with Dr. Macdonald - Found to have a 36 mm x 26 mm hypoechoic heterogenous lesion in the gastric antrum extending into the serosa. A 1 cm lymph node was seen along the gastrohepatic ligament. This was staged T3N1 by EUS Criteria.    He is s/p 4 cycles of FLOT with Dr. Barrera. \par \par 4/24/19 - s/p robotic assisted laparoscopic distal gastrectomy with Billroth II reconstruction, regional lymphadenectomy and esophagogastroduodenoscopy. \par \par Final pathology:\par 1) Common hepatic artery lymph node excision: 1/4 lymph nodes positive for metastatic adenocarcinoma\par 2) Right gastroepiploic lymph node: 0/1\par 3) Distal gastrectomy:  2.0 cm invasive moderately differentiated adenocarcinoma, carcinoma invades the muscularis propria, negative margins, 1/23 lymph nodes, Tumor stage: pT2 N1\par 4) Celiac and proximal splenic lymph nodes: 0/10\par \par Patient is clinically and radiologically CASIMIRO.  \par CT A/P 4/2021- CASIMIRO\par \par PLAN:\par 1) CT A/P now\par 2) Continue f/u with Dr. Ni (Heme-Onc)\par 3) RTO in 6 months\par

## 2021-09-09 NOTE — HISTORY OF PRESENT ILLNESS
[de-identified] : 60 year old male presents for a 6 month follow up visit. \par Initially consulted 1/8/19 for newly diagnosed gastric cancer, referred by Dr. Nguyen. \par \par The patient was found to have iron deficiency anemia and was experiencing right abdominal pain and was referred for an endoscopy on 12/21/18. On endoscopy he was found to have a large antral mass, about 4 cm, with central umbilication, found in the antrum, next to the incisura.  Pathology of the stomach antrum mass showed invasive moderately differentiated adenocarcinoma, negative for H. Pylori. \par \par Subsequently, he underwent a CT of the abdomen and pelvis on 12/28/18 which showed a mass in the gastric body near the antrum measuring 3.8 x 1.47 cm in size, mildly enlarged gastrohepatic ligament lymph nodes, enlarged spleen measuring 15.2 cm, numerous bilateral nonobstructing renal stones, bilateral renal stones, cholelithiasis. \par \par PMH notable for kidney stones s/p kidney stone extraction, right inguinal hernia repair, arthritis. Never a smoker, denies alcohol use.  Denies family history of malignancies. \par \par Today he is with c/o occasional right upper quadrant abdominal pain which is worse after eating x 1 week.  He denies nausea, vomiting or bowel habit changes. He states he was started on iron supplements and since then his stools have been very dark and he has been constipated. Denies BRBPR, passing flatus. \par \par INTERVAL HISTORY:\par 1/29/19 -S/p EUS with Dr. Macdonald - Found to have a 36 mm x 26 mm hypoechoic heterogenous lesion in the gastric antrum extending into the serosa. A 1 cm lymph node was seen along the gastrohepatic ligament. This was staged T3N1 by EUS Criteria. \par \par 2/5/19 - The patient is scheduled for a Mediport placement tomorrow. He will begin chemotherapy 5 days after Mediport placement under the care of Dr. Barrera.  Today he is with c/o mild, occasional mid abdominal discomfort. No other changes. \par \par 4/9/19 - Finished 4 cycles of FLOT with Dr. Barrera.\par \par *******SURGERY********\par 4/24/19 - s/p robotic assisted laparoscopic distal gastrectomy with Billroth II reconstruction, regional lymphadenectomy and esophagogastroduodenoscopy. \par \par Final pathology:\par 1) Common hepatic artery lymph node excision: 1/4 lymph nodes positive for metastatic adenocarcinoma\par 2) Right gastroepiploic lymph node: 0/1\par 3) Distal gastrectomy:  2.0 cm invasive moderately differentiated adenocarcinoma, carcinoma invades the muscularis propria, negative margins, 1/23 lymph nodes, Tumor stage: pT2 N1\par 4) Celiac and proximal splenic lymph nodes: 0/10\par \par 5/14/19 - Feeling well with no complaints. \par \par 11/12/19 - He states he completed adjuvant chemotherapy 2 months ago under the care of Dr. Bernardo Ni.  No recent imaging.  Today he is with c/o occasional right abdominal pain, 2/10 on pain scale.  He is able to tolerate small meals.  He experiences occasional vomiting with bigger and heavier meals.  Having regular BM's and passing flatus.  Denies fever or chills.  Feeling well overall. States he is scheduled for an endoscopy in December 2019.  \par \par CT A/P from 11/18/19 which showed no signs of recurrent disease.\par \par 7/28/2020 - Doing well.  No issues.\par \par Dr. Ni referred him for a CT abdomen/pelvis in October 2020 which showed no evidence of recurrent or metastatic disease.\par \par 3/2/21- He is scheduled to follow up with Dr. Ni in 2 months.  He has not seen GI and has not scheduled any follow up surveillance endoscopies.  He notes a small bump in his umbilical region that is painful if he lifts something heavy.  He also notes one day history of tenderness in his left upper quadrant.  He weight and appetite are preserved and he denies any nausea/vomiting, fever or chills.  \par \par CT A/P 4/3/2021- Postsurgical changed with no evidence of recurrent disease.  Bilateral simple and complex renal cysts with bilateral nonobstructing renal calculi. Cholelithiasis. \par \par 9/7/2021 - CT A/P 4/2021 with CASIMIRO.  F/u with GI? Endoscopy?  Dr. Ni? Hernia repair?

## 2021-10-05 ENCOUNTER — APPOINTMENT (OUTPATIENT)
Dept: SURGICAL ONCOLOGY | Facility: CLINIC | Age: 61
End: 2021-10-05
Payer: COMMERCIAL

## 2021-10-12 ENCOUNTER — APPOINTMENT (OUTPATIENT)
Dept: SURGICAL ONCOLOGY | Facility: CLINIC | Age: 61
End: 2021-10-12
Payer: COMMERCIAL

## 2021-10-12 VITALS
BODY MASS INDEX: 22.84 KG/M2 | WEIGHT: 121 LBS | OXYGEN SATURATION: 99 % | HEART RATE: 62 BPM | RESPIRATION RATE: 16 BRPM | DIASTOLIC BLOOD PRESSURE: 76 MMHG | HEIGHT: 61 IN | SYSTOLIC BLOOD PRESSURE: 123 MMHG

## 2021-10-12 PROCEDURE — 99213 OFFICE O/P EST LOW 20 MIN: CPT

## 2021-10-12 NOTE — ASSESSMENT
[FreeTextEntry1] : 60 year old male presents for a follow up visit. Initially consulted 1/8/19 for newly diagnosed gastric cancer, referred by Dr. Nguyen.  The patient was found to have iron deficiency anemia and was experiencing right abdominal pain and was referred for an endoscopy on 12/21/18. On endoscopy he was found to have a large antral mass, about 4 cm, with central umbilication, found in the antrum, next to the incisura.  Pathology of the stomach antrum mass showed invasive moderately differentiated adenocarcinoma, negative for H. Pylori.  Subsequently, he underwent a CT of the abdomen and pelvis on 12/28/18 which showed a mass in the gastric body near the antrum measuring 3.8 x 1.47 cm in size, mildly enlarged gastrohepatic ligament lymph nodes, enlarged spleen measuring 15.2 cm, numerous bilateral nonobstructing renal stones, bilateral renal stones, cholelithiasis. \par \par S/p EUS with Dr. Macdonald - Found to have a 36 mm x 26 mm hypoechoic heterogenous lesion in the gastric antrum extending into the serosa. A 1 cm lymph node was seen along the gastrohepatic ligament. This was staged T3N1 by EUS Criteria.    He is s/p 4 cycles of FLOT with Dr. Barrera. \par \par 4/24/19 - s/p robotic assisted laparoscopic distal gastrectomy with Billroth II reconstruction, regional lymphadenectomy and esophagogastroduodenoscopy. \par \par Final pathology:\par 1) Common hepatic artery lymph node excision: 1/4 lymph nodes positive for metastatic adenocarcinoma\par 2) Right gastroepiploic lymph node: 0/1\par 3) Distal gastrectomy:  2.0 cm invasive moderately differentiated adenocarcinoma, carcinoma invades the muscularis propria, negative margins, 1/23 lymph nodes, Tumor stage: pT2 N1\par 4) Celiac and proximal splenic lymph nodes: 0/10\par \par Patient is clinically and radiologically CASIMIRO.  \par CT A/P 4/2021- [facility]\par CT A/P @ [facility] 9/18/20[facility]- IMP: Stable examination from 4/2021. No evidence of recurrence or metastatic disease.  Stable mild splenic enlargement. Cholelithiasis.  Stable bilateral renal cysts. Stable B/L renal calculi (no obstructive calculi). Postsurgical changes in the stomach. \par \par PLAN:\par 1) CT A/P 9/2022\par 2) Continue f/u with Dr. Ni ([facility])\par 3) Follow up with GI for EGD\par 4) RTO in 6 months\par

## 2021-10-12 NOTE — CONSULT LETTER
[Dear  ___] : Dear  [unfilled], [Consult Letter:] : I had the pleasure of evaluating your patient, [unfilled]. [Please see my note below.] : Please see my note below. [Consult Closing:] : Thank you very much for allowing me to participate in the care of this patient.  If you have any questions, please do not hesitate to contact me. [Sincerely,] : Sincerely, [FreeTextEntry3] : Trey Peter MD, MPH, FACS\par Surgical Oncology\par Assistant Professor of Surgery\par [facility] [employer] at [facility]  [DrArleen  ___] : Dr. GAMEZ

## 2021-10-12 NOTE — HISTORY OF PRESENT ILLNESS
[de-identified] : 60 year old male presents for a 6 month follow up visit. \par Initially consulted 1/8/19 for newly diagnosed gastric cancer, referred by Dr. Nguyen. \par \par The patient was found to have iron deficiency anemia and was experiencing right abdominal pain and was referred for an endoscopy on 12/21/18. On endoscopy he was found to have a large antral mass, about 4 cm, with central umbilication, found in the antrum, next to the incisura.  Pathology of the stomach antrum mass showed invasive moderately differentiated adenocarcinoma, negative for H. Pylori. \par \par Subsequently, he underwent a CT of the abdomen and pelvis on 12/28/18 which showed a mass in the gastric body near the antrum measuring 3.8 x 1.47 cm in size, mildly enlarged gastrohepatic ligament lymph nodes, enlarged spleen measuring 15.2 cm, numerous bilateral nonobstructing renal stones, bilateral renal stones, cholelithiasis. \par \par PMH notable for kidney stones s/p kidney stone extraction, right inguinal hernia repair, arthritis. Never a smoker, denies alcohol use.  Denies family history of malignancies. \par \par Today he is with c/o occasional right upper quadrant abdominal pain which is worse after eating x 1 week.  He denies nausea, vomiting or bowel habit changes. He states he was started on iron supplements and since then his stools have been very dark and he has been constipated. Denies BRBPR, passing flatus. \par \par INTERVAL HISTORY:\par 1/29/19 -S/p EUS with Dr. Macdonald - Found to have a 36 mm x 26 mm hypoechoic heterogenous lesion in the gastric antrum extending into the serosa. A 1 cm lymph node was seen along the gastrohepatic ligament. This was staged T3N1 by EUS Criteria. \par \par 2/5/19 - The patient is scheduled for a Mediport placement tomorrow. He will begin chemotherapy 5 days after Mediport placement under the care of Dr. Barrera.  Today he is with c/o mild, occasional mid abdominal discomfort. No other changes. \par \par 4/9/19 - Finished 4 cycles of FLOT with Dr. Barrera.\par \par *******SURGERY********\par 4/24/19 - s/p robotic assisted laparoscopic distal gastrectomy with Billroth II reconstruction, regional lymphadenectomy and esophagogastroduodenoscopy. \par \par Final pathology:\par 1) Common hepatic artery lymph node excision: 1/4 lymph nodes positive for metastatic adenocarcinoma\par 2) Right gastroepiploic lymph node: 0/1\par 3) Distal gastrectomy:  2.0 cm invasive moderately differentiated adenocarcinoma, carcinoma invades the muscularis propria, negative margins, 1/23 lymph nodes, Tumor stage: pT2 N1\par 4) Celiac and proximal splenic lymph nodes: 0/10\par \par 5/14/19 - Feeling well with no complaints. \par \par 11/12/19 - He states he completed adjuvant chemotherapy 2 months ago under the care of Dr. Bernardo Ni.  No recent imaging.  Today he is with c/o occasional right abdominal pain, 2/10 on pain scale.  He is able to tolerate small meals.  He experiences occasional vomiting with bigger and heavier meals.  Having regular BM's and passing flatus.  Denies fever or chills.  Feeling well overall. States he is scheduled for an endoscopy in December 2019.  \par \par CT A/P from 11/18/19 which showed no signs of recurrent disease.\par \par 7/28/2020 - Doing well.  No issues.\par \par Dr. Ni referred him for a CT abdomen/pelvis in October 2020 which showed no evidence of recurrent or metastatic disease.\par \par 3/2/21- He is scheduled to follow up with Dr. Ni in 2 months.  He has not seen GI and has not scheduled any follow up surveillance endoscopies.  He notes a small bump in his umbilical region that is painful if he lifts something heavy.  He also notes one day history of tenderness in his left upper quadrant.  He weight and appetite are preserved and he denies any nausea/vomiting, fever or chills.  \par \par CT A/P 4/3/2021- Postsurgical changed with no evidence of recurrent disease.  Bilateral simple and complex renal cysts with bilateral nonobstructing renal calculi. Cholelithiasis. \par \par 9/7/2021 - CT A/P 4/2021 with [facility]O. \par \par CT A/P @ [facility] 9/18/2021- IMP: Stable examination from 4/2021. No evidence of recurrence or metastatic disease.  Stable mild splenic enlargement. Cholelithiasis.  Stable bilateral renal cysts. Stable B/L renal calculi (no obstructive calculi). Postsurgical changes in the stomach. \par \par 10/12/2021- Today he is feeling well.  He is eating well without nausea/vomiting and his weight is stable.  He is moving his bowels without difficulty.  He has not followed up with GI for a repeat endoscopy but continues routine medical oncology follow up.

## 2021-10-12 NOTE — PHYSICAL EXAM
[Normal] : supple, no neck mass and thyroid not enlarged [Normal Neck Lymph Nodes] : normal neck lymph nodes  [Normal Supraclavicular Lymph Nodes] : normal supraclavicular lymph nodes [Normal] : oriented to person, place and time, with appropriate affect [de-identified] : healed abdominal incisions with no mass or hernia

## 2021-12-15 ENCOUNTER — APPOINTMENT (OUTPATIENT)
Dept: NEUROLOGY | Facility: CLINIC | Age: 61
End: 2021-12-15

## 2022-09-21 NOTE — PATIENT PROFILE ADULT - NSPROMEDSADMININFO_GEN_A_NUR
lCaus 45 Transitions Follow Up Call    2022    Patient: Tyler Villagomez  Patient : 1974   MRN: [id number]  Reason for Admission:   Discharge Date: 22 RARS: Readmission Risk Score: 34.5         Follow up call attempted, left contact info on     Follow Up  Future Appointments   Date Time Provider Ely Camacho   10/20/2022  2:00 PM Andreea Ruelas MD [facility]   2022  8:00  [facility]   2023  2:30 PM Kristofer Small MD [address] [place] [address]       Tino Gamez RN immune no concerns

## 2022-10-24 PROBLEM — C16.9 GASTRIC ADENOCARCINOMA: Status: ACTIVE | Noted: 2019-01-08

## 2022-10-24 PROBLEM — Z08 ENCOUNTER FOR FOLLOW-UP SURVEILLANCE OF GASTRIC CANCER: Status: ACTIVE | Noted: 2019-08-13

## 2022-10-25 ENCOUNTER — APPOINTMENT (OUTPATIENT)
Dept: SURGICAL ONCOLOGY | Facility: CLINIC | Age: 62
End: 2022-10-25

## 2022-10-25 DIAGNOSIS — Z85.028 ENCOUNTER FOR FOLLOW-UP EXAMINATION AFTER COMPLETED TREATMENT FOR MALIGNANT NEOPLASM: ICD-10-CM

## 2022-10-25 DIAGNOSIS — C16.9 MALIGNANT NEOPLASM OF STOMACH, UNSPECIFIED: ICD-10-CM

## 2022-10-25 DIAGNOSIS — Z08 ENCOUNTER FOR FOLLOW-UP EXAMINATION AFTER COMPLETED TREATMENT FOR MALIGNANT NEOPLASM: ICD-10-CM

## 2022-10-25 NOTE — CONSULT LETTER
[Dear  ___] : Dear  [unfilled], [Consult Letter:] : I had the pleasure of evaluating your patient, [unfilled]. [Please see my note below.] : Please see my note below. [Consult Closing:] : Thank you very much for allowing me to participate in the care of this patient.  If you have any questions, please do not hesitate to contact me. [Sincerely,] : Sincerely, [DrArleen  ___] : Dr. GAMEZ [FreeTextEntry2] : Jh Nguyen MD [FreeTextEntry3] : Trey Peter MD, MPH, FACS\par Surgical Oncology\par Assistant Professor of Surgery\par [facility] [employer] at [facility]

## 2022-10-25 NOTE — ASSESSMENT
[FreeTextEntry1] : 62 year old male presents for a follow up visit. Initially consulted 1/8/19 for newly diagnosed gastric cancer, referred by Dr. Nguyen.  The patient was found to have iron deficiency anemia and was experiencing right abdominal pain and was referred for an endoscopy on 12/21/18. On endoscopy he was found to have a large antral mass, about 4 cm, with central umbilication, found in the antrum, next to the incisura.  Pathology of the stomach antrum mass showed invasive moderately differentiated adenocarcinoma, negative for H. Pylori.  Subsequently, he underwent a CT of the abdomen and pelvis on 12/28/18 which showed a mass in the gastric body near the antrum measuring 3.8 x 1.47 cm in size, mildly enlarged gastrohepatic ligament lymph nodes, enlarged spleen measuring 15.2 cm, numerous bilateral nonobstructing renal stones, bilateral renal stones, cholelithiasis. \par \par S/p EUS with Dr. Macdonald - Found to have a 36 mm x 26 mm hypoechoic heterogenous lesion in the gastric antrum extending into the serosa. A 1 cm lymph node was seen along the gastrohepatic ligament. This was staged T3N1 by EUS Criteria.    He is s/p 4 cycles of FLOT with Dr. Barrera. \par \par 4/24/19 - s/p robotic assisted laparoscopic distal gastrectomy with Billroth II reconstruction, regional lymphadenectomy and esophagogastroduodenoscopy. \par \par Final pathology:\par 1) Common hepatic artery lymph node excision: 1/4 lymph nodes positive for metastatic adenocarcinoma\par 2) Right gastroepiploic lymph node: 0/1\par 3) Distal gastrectomy:  2.0 cm invasive moderately differentiated adenocarcinoma, carcinoma invades the muscularis propria, negative margins, 1/23 lymph nodes, Tumor stage: pT2 N1\par 4) Celiac and proximal splenic lymph nodes: 0/10\par \par CT A/P 4/2021- CAS[facility]IRO\par CT A/P 9/18/2021- CASIMIRO\par \par CT A/P 8/2022- CASIMIRO\par Patient remains clinically and radiologically CASIMIRO.  \par \par \par PLAN:\par 1) CT A/P \par 2) Continue f/u with Dr. Ni ([facility])\par 3) Follow up with GI for EGD -- was this done?\par 4) RTO in 6 months\par

## 2022-10-25 NOTE — HISTORY OF PRESENT ILLNESS
[de-identified] : 62 year old male presents for a follow-up visit\par \par Initially consulted 1/8/19 for newly diagnosed gastric cancer, referred by Dr. Nguyen. \par The patient was found to have iron deficiency anemia and was experiencing right abdominal pain and was referred for an endoscopy on 12/21/18. On endoscopy he was found to have a large antral mass, about 4 cm, with central umbilication, found in the antrum, next to the incisura.  Pathology of the stomach antrum mass showed invasive moderately differentiated adenocarcinoma, negative for H. Pylori. \par \par Subsequently, he underwent a CT of the abdomen and pelvis on 12/28/18 which showed a mass in the gastric body near the antrum measuring 3.8 x 1.47 cm in size, mildly enlarged gastrohepatic ligament lymph nodes, enlarged spleen measuring 15.2 cm, numerous bilateral nonobstructing renal stones, bilateral renal stones, cholelithiasis. \par \par PMH notable for kidney stones s/p kidney stone extraction, right inguinal hernia repair, arthritis. Never a smoker, denies alcohol use.  Denies family history of malignancies. \par \par 1/8/2019- he is with c/o occasional right upper quadrant abdominal pain which is worse after eating x 1 week.  He denies nausea, vomiting or bowel habit changes. He states he was started on iron supplements and since then his stools have been very dark and he has been constipated. Denies BRBPR, passing flatus. \par \par 1/29/19 -S/p EUS with Dr. Macdonald - Found to have a 36 mm x 26 mm hypoechoic heterogenous lesion in the gastric antrum extending into the serosa. A 1 cm lymph node was seen along the gastrohepatic ligament. This was staged T3N1 by EUS Criteria. \par \par 2/5/19 - The patient is scheduled for a Mediport placement tomorrow. He will begin chemotherapy 5 days after Mediport placement under the care of Dr. Barrera.  Today he is with c/o mild, occasional mid abdominal discomfort. No other changes. \par \par 4/9/19 - Finished 4 cycles of FLOT with Dr. Barrera.\par \par *******SURGERY********\par 4/24/19 - s/p robotic assisted laparoscopic distal gastrectomy with Billroth II reconstruction, regional lymphadenectomy and esophagogastroduodenoscopy. \par \par Final pathology:\par 1) Common hepatic artery lymph node excision: 1/4 lymph nodes positive for metastatic adenocarcinoma\par 2) Right gastroepiploic lymph node: 0/1\par 3) Distal gastrectomy:  2.0 cm invasive moderately differentiated adenocarcinoma, carcinoma invades the muscularis propria, negative margins, 1/23 lymph nodes, Tumor stage: pT2 N1\par 4) Celiac and proximal splenic lymph nodes: 0/10\par \par 5/14/19 - Feeling well with no complaints. \par \par 11/12/19 - He states he completed adjuvant chemotherapy 2 months ago under the care of Dr. Bernardo Ni.  No recent imaging. He is with c/o occasional right abdominal pain, 2/10 on pain scale.  He is able to tolerate small meals.  He experiences occasional vomiting with bigger and heavier meals.  Having regular BM's and passing flatus.  Denies fever or chills.  Feeling well overall. States he is scheduled for an endoscopy in December 2019.  \par \par CT A/P from 11/18/19 which showed no signs of recurrent disease.\par \par 7/28/2020 - Doing well.  No issues.\par \par Dr. Ni referred him for a CT abdomen/pelvis in October 2020 which showed no evidence of recurrent or metastatic disease.\par \par 3/2/21- He is scheduled to follow up with Dr. Ni in 2 months.  He has not seen GI and has not scheduled any follow up surveillance endoscopies.  He notes a small bump in his umbilical region that is painful if he lifts something heavy.  He also notes one day history of tenderness in his left upper quadrant.  He weight and appetite are preserved and he denies any nausea/vomiting, fever or chills.  \par \par CT A/P 4/3/2021- Postsurgical changed with no evidence of recurrent disease.  Bilateral simple and complex renal cysts with bilateral nonobstructing renal calculi. Cholelithiasis. \par \par 9/7/2021 - CT A/P 4/2021 with CASIMIRO. \par \par CT A/P @ [facility] 9/18/2021- IMP: Stable examination from 4/2021. No evidence of recurrence or metastatic disease.  Stable mild splenic enlargement. Cholelithiasis.  Stable bilateral renal cysts. Stable B/L renal calculi (no obstructive calculi). Postsurgical changes in the stomach. \par \par 10/12/2021- He is feeling well.  He is eating well without nausea/vomiting and his weight is stable.  He is moving his bowels without difficulty.  He has not followed up with GI for a repeat endoscopy but continues routine medical oncology follow up. \par \par CT A/P 8/2022 @ [facility] - No acute intra-abdominal findings. No findings of recurrence or metastatic disease. 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

## 2022-10-25 NOTE — PHYSICAL EXAM
[Normal] : supple, no neck mass and thyroid not enlarged [Normal Neck Lymph Nodes] : normal neck lymph nodes  [Normal Supraclavicular Lymph Nodes] : normal supraclavicular lymph nodes [Normal] : oriented to person, place and time, with appropriate affect [de-identified] : healed abdominal incisions with no mass or hernia

## 2022-12-19 ENCOUNTER — APPOINTMENT (OUTPATIENT)
Dept: ORTHOPEDIC SURGERY | Facility: CLINIC | Age: 62
End: 2022-12-19

## 2022-12-19 VITALS — WEIGHT: 116 LBS | BODY MASS INDEX: 21.9 KG/M2 | HEIGHT: 61 IN

## 2022-12-19 DIAGNOSIS — Z00.00 ENCOUNTER FOR GENERAL ADULT MEDICAL EXAMINATION W/OUT ABNORMAL FINDINGS: ICD-10-CM

## 2022-12-19 PROCEDURE — 99203 OFFICE O/P NEW LOW 30 MIN: CPT | Mod: 57

## 2022-12-19 NOTE — PHYSICAL EXAM
[de-identified] : L hand: \par Tender volar wrist \par Good finger ROM \par +Tinels \par +Phalens \par +Compression test \par Decreased sensation median nerve distribution\par +thenar atrophy\par

## 2022-12-19 NOTE — HISTORY OF PRESENT ILLNESS
[Gradual] : gradual [8] : 8 [6] : 6 [Dull/Aching] : dull/aching [Sharp] : sharp [Tingling] : tingling [Nothing helps with pain getting better] : Nothing helps with pain getting better [Sleep] : sleep [de-identified] : L hand numbnes and tinlging- getting worse\par \par EMG shows mod L CTS [] : no [FreeTextEntry1] : B/L hands  [FreeTextEntry3] : 6 months  [FreeTextEntry5] : patient states he has pain and numbness [FreeTextEntry6] : numbness [FreeTextEntry7] : up the arm  [de-identified] : activity  [de-identified] : 10/14/22 [de-identified] : Jordan Andrade  [de-identified] : MRI , EMG

## 2023-01-18 ENCOUNTER — APPOINTMENT (OUTPATIENT)
Age: 63
End: 2023-01-18

## 2023-01-26 ENCOUNTER — APPOINTMENT (OUTPATIENT)
Dept: ORTHOPEDIC SURGERY | Facility: CLINIC | Age: 63
End: 2023-01-26

## 2023-02-02 ENCOUNTER — APPOINTMENT (OUTPATIENT)
Dept: NEUROLOGY | Facility: CLINIC | Age: 63
End: 2023-02-02

## 2023-03-06 ENCOUNTER — APPOINTMENT (OUTPATIENT)
Dept: ORTHOPEDIC SURGERY | Facility: CLINIC | Age: 63
End: 2023-03-06
Payer: COMMERCIAL

## 2023-03-06 VITALS — BODY MASS INDEX: 21.9 KG/M2 | WEIGHT: 116 LBS | HEIGHT: 61 IN

## 2023-03-06 DIAGNOSIS — G56.02 CARPAL TUNNEL SYNDROME, LEFT UPPER LIMB: ICD-10-CM

## 2023-03-06 PROCEDURE — 20526 THER INJECTION CARP TUNNEL: CPT | Mod: 50

## 2023-03-06 PROCEDURE — J3490M: CUSTOM

## 2023-03-06 PROCEDURE — 99214 OFFICE O/P EST MOD 30 MIN: CPT | Mod: 25,57

## 2023-03-06 NOTE — PHYSICAL EXAM
[de-identified] : R hand: \par Tender volar wrist \par Good finger ROM \par +Tinels \par +Phalens \par +Compression test \par Decreased sensation median nerve distribution\par \par \par L hand: \par Tender volar wrist \par Good finger ROM \par +Tinels \par +Phalens \par +Compression test \par Decreased sensation median nerve distribution\par +thenar atrophy\par

## 2023-03-06 NOTE — HISTORY OF PRESENT ILLNESS
[8] : 8 [6] : 6 [Dull/Aching] : dull/aching [Tingling] : tingling [de-identified] : Bilateral hand numbness [FreeTextEntry1] : hands  [FreeTextEntry5] : pain is the same\par \par \par \par  [FreeTextEntry6] : numbness [de-identified] : no

## 2023-03-06 NOTE — DISCUSSION/SUMMARY
[de-identified] : For L CTR (Sept)\par R/B/A of surgery discussed with the patient. Risks including but not limited to infection, nerve damage, tendon damage, pain, stiffness, recurrence, no resolution of symptoms, loss of function, limb or life. They understand and agree to surgery \par Return post op
